# Patient Record
(demographics unavailable — no encounter records)

---

## 2017-10-31 NOTE — EMERGENCY ROOM VISIT NOTE
History


Report prepared by [place]:  Matheus Morin


Under the Supervision of:  Dr. Dick Muñoz M.D.


First contact with patient:  18:09


Chief Complaint:  CHEST PAIN


Stated Complaint:  PAIN IN CHEST,INABILITY TO EAT





History of Present Illness


The patient is a 32 year old male who presents to the Emergency Room with 

complaints of constant left sided chest pain beginning 10 weeks ago. The 

patient states that his pain is centralized in his lower left chest. He notes 

that his pain has prevented him from eating a lot for the past 10 weeks. He 

reports that the pain is not major, but states that the pain is enough to 

prevent him from eating. He notes that his pain is not mental but feels as 

though he has "poison in his blood stream." He reports that he has been able to 

drink fluids. He also complains of SOB, nausea, numbness in his left arm, and 

vision issues in his eyes. The patient states that he occasionally has a "light 

spot" right in the center of his eyes that blurs his vision. He notes chronic 

pain in his joints for the past few years that waxes and wanes in intensity. He 

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 

raise chickens and cows. He denies any rashes, cough, burning or bloody urine, 

congestion, diarrhea, and exposure to ticks.





   Source of History:  patient


   Onset:  10 weeks ago


   Position:  chest


   Timing:  constant


   Modifying Factors (Worsening):  eating


   Associated Symptoms:  + SOB, + nausea, + numbness (in his left arm), No cough

, No diarrhea, No urinary symptoms


Note:


he also complains of vision problems. he denies  any congestion and rashes





Review of Systems


See HPI for pertinent positives and negatives.  A total of ten systems were 

reviewed and were otherwise negative.





Past Medical & Surgical


Medical Problems:


(1) Chronic pain








Family History


No pertinent family history stated.





Social History


Smoking Status:  Never Smoker


Marital Status:  single


Housing Status:  lives with family


Occupation Status:  unemployed





Current/Historical Medications


Scheduled


Doxycycline Monohydrate (Monodox), 100 MG PO BID





Scheduled PRN


Ibuprofen Tab (Motrin), 800 MG PO Q8H PRN for Pain





Allergies


Coded Allergies:  


     No Known Allergies (Unverified , 10/31/17)





Physical Exam


Vital Signs











  Date Time  Temp Pulse Resp B/P (MAP) Pulse Ox O2 Delivery O2 Flow Rate FiO2


 


10/31/17 23:19  72 16 126/84 97   


 


10/31/17 22:31  80 20 127/83 97   


 


10/31/17 21:00  93 23 126/87 95 Room Air  


 


10/31/17 19:12  79      


 


10/31/17 18:55     100 Room Air  


 


10/31/17 18:47  84 18 128/82 97 Room Air  


 


10/31/17 18:05 36.7 118 16 144/91 99 Room Air  











Physical Exam


GENERAL: Awake, alert, in no acute distress, anxious appearing.


HENT: Normocephalic, atraumatic. Oropharynx unremarkable. Dry mucous membranes.


EYES: Normal conjunctiva. Sclera non-icteric.


NECK: Supple. No nuchal rigidity. FROM. No JVD.


RESPIRATORY: Clear to auscultation.


CARDIAC: Regular rate, normal rhythm. Extremities warm and well perfused. 

Pulses equal.


ABDOMEN: Soft, non-distended. No tenderness to palpation. No rebound or 

guarding. No masses.


RECTAL: Deferred.


MUSCULOSKELETAL: Chest examination reveals no tenderness. The back is 

symmetrical on inspection without obvious abnormality. There is no CVA 

tenderness to palpation. No joint edema. 


LOWER EXTREMITIES: Calves are equal size bilaterally and non-tender. No edema. 

No discoloration. 


NEURO: Normal sensorium. No sensory or motor deficits noted. 


SKIN: No rash or jaundice noted.





Medical Decision & Procedures


ER Provider


Diagnostic Interpretation:


Radiology results as stated below per my review and radiologist interpretation: 





CHEST ONE VIEW PORTABLE





FINDINGS: Lung volumes are normal. Lungs are clear. No pneumothorax or pleural


effusion is present. Cardiomediastinal silhouette is normal. Pulmonary


vascularity is normal. 





IMPRESSION:  No acute cardiopulmonary findings. 





Electronically signed by:  Yuri Richmond M.D.


10/31/2017 6:59 PM





HEAD WITHOUT CONTRAST (CT)





FINDINGS: 





No acute intracranial hemorrhage, midline shift, mass, large territorial


ischemia or abnormal extra-axial collection.  





The calvarium is intact.  The paranasal sinuses, mastoid air cells, and middle


ear cavities are clear. Focal soft tissue prominence of the right frontal scalp


is seen, 2.0 x 0.8 cm with areas of internal fatty attenuation suggesting scalp


lipoma.





IMPRESSION:  


1. No acute intracranial abnormality.


2. Focal soft tissue prominence of the right frontal scalp with fat attenuation


suggests scalp lipoma.





The above report was generated using voice recognition software. It may contain


grammatical, syntax or spelling errors.





Electronically signed by:  Tommy Frances M.D.


10/31/2017 7:36 PM





Laboratory Results


10/31/17 18:40








Red Blood Count 5.69, Mean Corpuscular Volume 84.7, Mean Corpuscular Hemoglobin 

28.8, Mean Corpuscular Hemoglobin Concent 34.0, Mean Platelet Volume 9.7, 

Neutrophils (%) (Auto) 63.9, Lymphocytes (%) (Auto) 25.7, Monocytes (%) (Auto) 

9.6, Eosinophils (%) (Auto) 0.4, Basophils (%) (Auto) 0.3, Neutrophils # (Auto) 

4.52, Lymphocytes # (Auto) 1.82, Monocytes # (Auto) 0.68, Eosinophils # (Auto) 

0.03, Basophils # (Auto) 0.02





10/31/17 18:40

















Test


  10/31/17


18:40 10/31/17


19:50


 


White Blood Count


  7.08 K/uL


(4.8-10.8) 


 


 


Red Blood Count


  5.69 M/uL


(4.7-6.1) 


 


 


Hemoglobin


  16.4 g/dL


(14.0-18.0) 


 


 


Hematocrit 48.2 % (42-52)  


 


Mean Corpuscular Volume


  84.7 fL


() 


 


 


Mean Corpuscular Hemoglobin


  28.8 pg


(25-34) 


 


 


Mean Corpuscular Hemoglobin


Concent 34.0 g/dl


(32-36) 


 


 


Platelet Count


  248 K/uL


(130-400) 


 


 


Mean Platelet Volume


  9.7 fL


(7.4-10.4) 


 


 


Neutrophils (%) (Auto) 63.9 %  


 


Lymphocytes (%) (Auto) 25.7 %  


 


Monocytes (%) (Auto) 9.6 %  


 


Eosinophils (%) (Auto) 0.4 %  


 


Basophils (%) (Auto) 0.3 %  


 


Neutrophils # (Auto)


  4.52 K/uL


(1.4-6.5) 


 


 


Lymphocytes # (Auto)


  1.82 K/uL


(1.2-3.4) 


 


 


Monocytes # (Auto)


  0.68 K/uL


(0.11-0.59) 


 


 


Eosinophils # (Auto)


  0.03 K/uL


(0-0.5) 


 


 


Basophils # (Auto)


  0.02 K/uL


(0-0.2) 


 


 


RDW Standard Deviation


  37.7 fL


(36.4-46.3) 


 


 


RDW Coefficient of Variation


  12.4 %


(11.5-14.5) 


 


 


Immature Granulocyte % (Auto) 0.1 %  


 


Immature Granulocyte # (Auto)


  0.01 K/uL


(0.00-0.02) 


 


 


Erythrocyte Sedimentation Rate 2 mm/hr (0-14)  


 


Anion Gap


  9.0 mmol/L


(3-11) 


 


 


Est Creatinine Clear Calc


Drug Dose 92.9 ml/min 


  


 


 


Estimated GFR (


American) 99.1 


  


 


 


Estimated GFR (Non-


American 85.5 


  


 


 


BUN/Creatinine Ratio 15.2 (10-20)  


 


Calcium Level


  9.8 mg/dl


(8.5-10.1) 


 


 


Total Bilirubin


  0.7 mg/dl


(0.2-1) 


 


 


Direct Bilirubin


  0.2 mg/dl


(0-0.2) 


 


 


Aspartate Amino Transf


(AST/SGOT) 14 U/L (15-37) 


  


 


 


Alanine Aminotransferase


(ALT/SGPT) 18 U/L (12-78) 


  


 


 


Alkaline Phosphatase


  40 U/L


() 


 


 


Troponin I


  < 0.015 ng/ml


(0-0.045) 


 


 


C-Reactive Protein


  < 0.29 mg/dl


(0-0.29) 


 


 


Total Protein


  7.7 gm/dl


(6.4-8.2) 


 


 


Albumin


  4.8 gm/dl


(3.4-5.0) 


 


 


Lipase


  115 U/L


() 


 


 


Thyroid Stimulating Hormone


(TSH) 1.760 uIu/ml


(0.300-4.500) 


 


 


Lyme Disease IgG Antibody POS (NEG)  


 


Urine Color  YELLOW 


 


Urine Appearance  CLEAR (CLEAR) 


 


Urine pH  6.0 (4.5-7.5) 


 


Urine Specific Gravity


  


  1.029


(1.000-1.030)


 


Urine Protein  NEG (NEG) 


 


Urine Glucose (UA)  NEG (NEG) 


 


Urine Ketones  4+ (NEG) 


 


Urine Occult Blood  NEG (NEG) 


 


Urine Nitrite  NEG (NEG) 


 


Urine Bilirubin  NEG (NEG) 


 


Urine Urobilinogen  NEG (NEG) 


 


Urine Leukocyte Esterase  NEG (NEG) 





Laboratory results reviewed by me





Medications Administered











 Medications


  (Trade)  Dose


 Ordered  Sig/Alexia


 Route  Start Time


 Stop Time Status Last Admin


Dose Admin


 


 Sodium Chloride  1,000 ml @ 


 999 mls/hr  Q1H1M STAT


 IV  10/31/17 18:14


 10/31/17 19:14 DC 10/31/17 18:43


999 MLS/HR


 


 Famotidine


  (Pepcid 20mg Iv


 Push)  20 mg  NOW  STAT


 IV  10/31/17 18:14


 10/31/17 18:25 DC 10/31/17 19:40


20 MG


 


 Ondansetron HCl


  (Zofran Inj)  4 mg  NOW  STAT


 IV  10/31/17 18:14


 10/31/17 18:25 DC 10/31/17 18:43


4 MG


 


 Lidocaine HCl


  (Viscous


 Lidocaine 2% Soln)  20 ml  STK-MED ONCE


 .ROUTE  10/31/17 18:41


 10/31/17 18:42 DC 10/31/17 18:44


20 ML


 


 Al Hydroxide/Mg


 Hydroxide


  (Maalox Susp)  30 ml  STK-MED ONCE


 .ROUTE  10/31/17 18:41


 10/31/17 18:42 DC 10/31/17 18:44


30 ML


 


 Ketorolac


 Tromethamine


  (Toradol Inj)  30 mg  NOW  STAT


 IV  10/31/17 20:40


 10/31/17 20:42 DC 10/31/17 20:52


30 MG


 


 Ceftriaxone


 Sodium 2000 mg/


 Dextrose  70 ml @ 


 100 mls/hr  ONE  STAT


 IV  10/31/17 20:40


 10/31/17 21:21 DC 10/31/17 20:51


100 MLS/HR











ECG


Indication:  chest pain


Rate (beats per minute):  87


Rhythm:  normal sinus


Findings:  no acute ischemic change, other (normal axis)





ED Course


1813: The patient was evaluated in room C6. A complete history and physical 

exam was performed.





1814: GI Cocktail 24ml PO, Zofran Inj 4mg IV, Famotidine 20mg IV





1849: I did a bedside echo and gallbladder US on the patient. The echo showed 

no pericardial effusion and normal LV and RV size effusion. The gallbladder US 

showed no gallstones or pericholecystic fluid.





2030: I reevaluated and updated the patient. He states that the body pain that 

he has been experiencing for the past few years has mostly been migrating joint 

pain. He notes that his chest pain is new. 





2040: Ceftriaxone Sodium 2000mg/Dextrose 70 ml @ 100mls/hr IV, Toradol Inj 30mg 

IV





2211: I reevaluated the patient. Discussed results and discharge instructions: 

He verbalized understanding and agreement. The patient is ready for discharge.





Medical Decision


I reviewed the patient's past medical history, medications, and the nursing 

notes as described above.





Differential diagnoses include: gastritis, reflux, pneumonia, bronchitis, 

pneumonitis, pericarditis, lyme, dehydration, electrolyte abnormality, 

depression, and anxiety.





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 

past medical history of an AVM status post repair remotely who presents 

emergency Department with vague complaints of epigastric and chest pain that 

his been constant with associated anorexia and weight loss for the past 2 

months with associated intermittent nausea, shortness of breath, tingling per 

history of present illness.  Of note the patient reports that he is a long-

standing history for years of chronic pain everywhere and is somewhat vague 

about the last time he did not have any pain but thinks a few months ago hes 

had a couple days here and there.  Despite this hes never seen a doctor for 

his symptoms and comes to emergency department today because he feels he was 

worse and thought it was about time he was evaluated.  Arrival the patient is 

anxious appearing but otherwise in no acute distress, afebrile with stable 

vital signs.  Lungs clear to auscultation bilaterally. 


EKG unremarkable with normal intervals and no NH depression or JODI. Negative 

Spodick's sign. Bedside echo with no pericardial effusion, grossly normal LV 

and RV size and function. Bedside RUQ US negative for gallstone and negative 

for pericholecystic fluid.


WBC, trop, ESR, CRP wnl. Thus myocarditis and pericarditis unlikely. However, 

Lyme screen positive and given patient's constant sx of chest burning will need 

cards f/u for formal echo. Cardiac CRP sent and pending. UA with ketones c/w 

patient clinically dry appearance. Labs otherwise unremarkable. CXR negative. 


Will treat for Lyme with 21 day course. Given initial IV dose of CTX in ED. CM 

assisting to arrange follow up given patient does not have insurance and does 

not have a pcp. However patient preferring to arrange f/u on his own. Parents 

at bedside update and are aware. Findings and plan/importance for follow-up 

reviewed with patient. Patient agreeable and d/c'd per discharge instructions.





[place] Drug Monitoring Program


Search Results:  patient reviewed within database





Blood Pressure Screening


Patient's blood pressure:  Elevated blood pressure


Blood pressure disposition:  Elevated BP felt to be situational





Impression





 Primary Impression:  


 Substernal precordial chest pain


 Additional Impression:  


 Lyme disease, unspecified





Scribe Attestation


The scribe's documentation has been prepared under my direction and personally 

reviewed by me in its entirety. I confirm that the note above accurately 

reflects all work, treatment, procedures, and medical decision making performed 

by me.





Departure Information


Dispostion


Home / Self-Care





Prescriptions





Ibuprofen Tab (MOTRIN) 800 Mg Tab


800 MG PO Q8H Y for Pain for 14 Days, #42 TAB


   Prov: Dick Muñoz M.D.         10/31/17 


Doxycycline Monohydrate (Monodox) 100 Mg Cap


100 MG PO BID for 21 Days, #42 CAP


   Prov: Dick Muñoz M.D.         10/31/17





Forms


Call Back Authorization, HOME CARE DOCUMENTATION FORM,                         

                                       IMPORTANT VISIT INFORMATION





Patient Instructions


Chest Pain - [facility], ED Chest Pain Atypical Unkn Cause, [facility]





Additional Instructions





Please follow up with and establish a primary care physician and with 

cardiology within the next week for re-evaluation.


This is very important that you call to make these appointments as you did not 

want our  to assist with this.





The cause of your symptoms is not clear at this time may be due to Lyme disease.


Otherwise, your exam, EKG, chest xray, CT scan of your head, and lab results 

did not show signs of an emergent condition at this time.





Doxycycline, antibiotic, as directed for 21 days.


Ibuprofen for pain as needed as directed.


Drink plenty of fluids to ensure hydration.





Return to the emergency department for worsening symptoms as described in the 

accompanying instructions.





Problem Qualifiers

## 2017-10-31 NOTE — DIAGNOSTIC IMAGING REPORT
CHEST ONE VIEW PORTABLE



CLINICAL HISTORY: Chest pain.    



COMPARISON STUDY:  No previous studies for comparison.



FINDINGS: Lung volumes are normal. Lungs are clear. No pneumothorax or pleural

effusion is present. Cardiomediastinal silhouette is normal. Pulmonary

vascularity is normal. 



IMPRESSION:  No acute cardiopulmonary findings. 









Electronically signed by:  Yuri Richmond M.D.

10/31/2017 6:59 PM



Dictated Date/Time:  10/31/2017 6:58 PM

## 2017-10-31 NOTE — DIAGNOSTIC IMAGING REPORT
HEAD WITHOUT CONTRAST (CT)



CLINICAL HISTORY: 32 years-old Male with blurred vision, prior AVM resection. 

Acute blurred vision. History of prior AVM resection.  



TECHNIQUE: Multiple axial CT images of the head were obtained without contrast. 

A dose lowering technique was utilized adhering to the principles of ALARA.



CT DOSE:  537.48 mGy.cm



COMPARISON: None.



FINDINGS: 



No acute intracranial hemorrhage, midline shift, mass, large territorial

ischemia or abnormal extra-axial collection.  



The calvarium is intact.  The paranasal sinuses, mastoid air cells, and middle

ear cavities are clear. Focal soft tissue prominence of the right frontal scalp

is seen, 2.0 x 0.8 cm with areas of internal fatty attenuation suggesting scalp

lipoma.



IMPRESSION:  

1. No acute intracranial abnormality.

2. Focal soft tissue prominence of the right frontal scalp with fat attenuation

suggests scalp lipoma.







The above report was generated using voice recognition software. It may contain

grammatical, syntax or spelling errors.







Electronically signed by:  Tommy Frances M.D.

10/31/2017 7:36 PM



Dictated Date/Time:  10/31/2017 7:32 PM
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
## 2017-11-06 NOTE — EMERGENCY ROOM VISIT NOTE
History


Report prepared by Isreal:  Janine Reyes


Under the Supervision of:  Dr. Jean-Pierre Bajwa M.D.


First contact with patient:  11:01


Chief Complaint:  MENTAL HEALTH EVALUATION


Stated Complaint:  SUICIDAL, HEART





History of Present Illness


The patient is a 32 year old male who presents to the Emergency Room with 

complaints of intermittent anxiety that began several weeks ago.  The patient 

states that 10 weeks ago he had a trigger and notes increased stress.  He 

states that over the past several weeks he states that things have become 

overwhelming for him.  The patient states that he feels defeated and has lost 

the will to live.  He states that he has been taking all his medications as 

prescribed.  The patient describes four different episodes of anxiety going 

back several weeks.  He reports that with each episode he notes a heavy stress 

and strain on his heart.  The patient states that the first one lasted five 

minutes and notes that he was breathing heavily.  He states that he did not see 

anyone for the symptoms.  The patient states that he additionally noticed left 

arm numbness at that time.  He states that his second episode was on Friday 

noting that he was outside with his dogs.  The patient states that this episode 

lasted 5-10 minutes.  He states that he also had an episodes that occurred on 

Saturday and Sunday and notes that each of these episodes were longer.  The 

patient states that he feels exhausted after each episode.  He states that he 

does not feel safe going home, noting that he would hang himself, noting that 

he has no access to firearms.  The patient denies any diagnosis of anxiety or 

depression.  He states that he does feel secure around people.  





Per records, the patient was here on October 31st for chest pain and had a 

negative work up except for possible lyme disease.  He was also here on 

November 2nd for shortness of breath with a negative work up.  He is on 

Doxycycline for the possibility of Lyme Disease--the confirmative testing is 

still pending.





   Source of History:  patient


   Onset:  several weeks ago


   Position:  other (global)


   Quality:  other (anxiety)


   Timing:  other (persistent)


Note:


Associated Symptoms: suicidal with plan to hang himself, increased stress, 

chest heaviness





Review of Systems


See HPI for pertinent positives & negatives. A total of 10 systems reviewed and 

were otherwise negative.





Past Medical & Surgical


Medical Problems:


(1) Anxiety


(2) Chronic pain


(3) Lyme disease


(4) Suicidal ideation








Family History





Patient reports no known family medical history.





Social History


Smoking Status:  Never Smoker


Drug Use:  none


Marital Status:  single


Housing Status:  lives with family


Occupation Status:  unemployed





Current/Historical Medications


Scheduled


Doxycycline Monohydrate (Monodox), 100 MG PO BID





Scheduled PRN


Ibuprofen Tab (Motrin), 800 MG PO Q8H PRN for Pain


Pantoprazole (Protonix), 20 MG PO DAILY PRN for ACID REFLUX


Ranitidine Hcl (Zantac), 150 MG PO BID PRN for ACID REFLUX





Allergies


Coded Allergies:  


     No Known Allergies (Unverified , 11/6/17)





Physical Exam


Vital Signs











  Date Time  Temp Pulse Resp B/P (MAP) Pulse Ox O2 Delivery O2 Flow Rate FiO2


 


11/6/17 13:25  90 18 126/86 99 Room Air  


 


11/6/17 10:41 37.0 109 18 145/114 100 Room Air  











Physical Exam


GENERAL: Patient is in no acute distress.


HEENT: No acute trauma, normocephalic atraumatic, mucous membranes moist, no 

nasal congestion, no scleral icterus.


NECK: No stridor, no adenopathy, no meningismus, trachea is midline.


LUNGS: Clear to auscultation bilaterally, no wheeze, no rhonchi, breath sounds 

equal.


HEART: Without murmurs gallops or rubs, regular rate and rhythm.


ABDOMEN: Soft, nontender, bowel sounds positive, no hernias, no peritonitis.


EXTREMITIES: No cyanosis or edema, full range of motion of all the joints 

without pain or difficulty, no signs for acute trauma.


NEUROLOGIC: Oriented x 3, no acute motor or sensory deficits, no focal weakness.


SKIN: No rash, no jaundice, no diaphoresis.


PSYCH: Anxious, admits to suicidal ideation with a plan to hang himself.





Medical Decision & Procedures


Laboratory Results


11/6/17 11:24








11/6/17 11:24

















Test


  11/6/17


11:00 11/6/17


11:24


 


Urine Color YELLOW  


 


Urine Appearance CLOUDY (CLEAR)  


 


Urine pH


  >= 9.0


(4.5-7.5) 


 


 


Urine Specific Gravity


  1.027


(1.000-1.030) 


 


 


Urine Protein NEG (NEG)  


 


Urine Glucose (UA) NEG (NEG)  


 


Urine Ketones 1+ (NEG)  


 


Urine Occult Blood NEG (NEG)  


 


Urine Nitrite NEG (NEG)  


 


Urine Bilirubin NEG (NEG)  


 


Urine Urobilinogen NEG (NEG)  


 


Urine Leukocyte Esterase TRACE (NEG)  


 


Urine WBC (Auto) 1-5 /hpf (0-5)  


 


Urine RBC (Auto) 0-4 /hpf (0-4)  


 


Urine Hyaline Casts (Auto) 0 /lpf (0-5)  


 


Urine Epithelial Cells (Auto) 0-5 /lpf (0-5)  


 


Urine Bacteria (Auto) NEG (NEG)  


 


Urine Opiates Screen NEG (NEG)  


 


Urine Methadone, Qualitative NEG (NEG)  


 


Urine Barbiturates NEG (NEG)  


 


Urine Phencyclidine (PCP)


Level NEG (NEG) 


  


 


 


Ur


Amphetamine/Methamphetamine NEG (NEG) 


  


 


 


MDMA (Ecstasy) Screen NEG (NEG)  


 


Urine Benzodiazepines Screen NEG (NEG)  


 


Urine Cocaine Metabolite NEG (NEG)  


 


Urine Marijuana (THC) NEG (NEG)  


 


Red Blood Count


  


  5.60 M/uL


(4.7-6.1)


 


Mean Corpuscular Volume


  


  85.5 fL


()


 


Mean Corpuscular Hemoglobin


  


  29.8 pg


(25-34)


 


Mean Corpuscular Hemoglobin


Concent 


  34.9 g/dl


(32-36)


 


RDW Standard Deviation


  


  39.0 fL


(36.4-46.3)


 


RDW Coefficient of Variation


  


  12.4 %


(11.5-14.5)


 


Mean Platelet Volume


  


  10.2 fL


(7.4-10.4)


 


Anion Gap


  


  6.0 mmol/L


(3-11)


 


Est Creatinine Clear Calc


Drug Dose 


  93.8 ml/min 


 


 


Estimated GFR (


American) 


  101.3 


 


 


Estimated GFR (Non-


American 


  87.4 


 


 


BUN/Creatinine Ratio  14.6 (10-20) 


 


Calcium Level


  


  9.6 mg/dl


(8.5-10.1)


 


Total Bilirubin


  


  0.6 mg/dl


(0.2-1)


 


Aspartate Amino Transf


(AST/SGOT) 


  9 U/L (15-37) 


 


 


Alanine Aminotransferase


(ALT/SGPT) 


  17 U/L (12-78) 


 


 


Alkaline Phosphatase


  


  45 U/L


()


 


Total Protein


  


  7.8 gm/dl


(6.4-8.2)


 


Albumin


  


  4.7 gm/dl


(3.4-5.0)


 


Globulin


  


  3.1 gm/dl


(2.5-4.0)


 


Albumin/Globulin Ratio  1.5 (0.9-2) 


 


Thyroid Stimulating Hormone


(TSH) 


  0.727 uIu/ml


(0.300-4.500)


 


Salicylates Level


  


  < 1.7 mg/dl


(2.8-20)


 


Acetaminophen Level


  


  < 2 ug/ml


(10-30)


 


Ethyl Alcohol mg/dL


  


  < 3.0 mg/dl


(0-3)








Laboratory results reviewed by me.





ED Course


1103: The patient was evaluated in room A6. A complete history and physical 

exam was performed.





1229: The patient is medically clear for further psychiatric evaluation.





1340: The patient has been accepted to [facility] for further mental health 

evaluation and treatment.





Medical Decision


The patient is a 32 year old male who presents to the ED with complaints of 

anxiety.  Differential diagnoses considered include Suicidal ideation, thyroid 

disorder, drug and alcohol abuse, psychosis, anxiety, electrolyte imbalance.





There is no leukocytosis or concerning anemia.  No significant electrolyte 

abnormality, kidney failure or hepatitis.  The patient appears to be in a 

euthyroid state.  Urine tox is negative.  Urinalysis does not show infection.  

Alcohol, aspirin and Tylenol levels are essentially undetectable.





The patient resents with what sounds like anxiety and panic.  He has worsened 

over the last few days and is now suicidal.  He has a plan to hang himself.





The patient is voluntary, I do think he is medically clear for a psychiatric 

evaluation. 





The patient was seen by the psychiatry team, he is being hospitalized on the 3 

S. psychiatric floor.  He has been cooperative during his ER stay.





Medication Reconcilliation


Current Medication List:  was personally reviewed by me





Impression





 Primary Impression:  


 Suicidal ideation


 Additional Impression:  


 Anxiety





Scribe Attestation


The scribe's documentation has been prepared under my direction and personally 

reviewed by me in its entirety. I confirm that the note above accurately 

reflects all work, treatment, procedures, and medical decision making performed 

by me.





Departure Information


Dispostion


Mental Health Acute Care





Referrals


No Doctor, Assigned (PCP)





Problem Qualifiers

## 2017-11-07 NOTE — PHARMACY PROGRESS NOTE
ED Pharmacist Culture FollowUp


Date of Service:


Nov 7, 2017.





Patient was sent home with a prescription for doxycycline, which should cover 

for the positive lymes result. Of note, the patient is now inpatient and 

continues on doxy course as per MD note. Charge nurse also called and informed 

nurse of the result.

## 2017-11-07 NOTE — PSYCHIATRIC PROGRESS NOTES
Progress Note


Date of Service


Nov 7, 2017.





Interval History


Cain Zhou is a 32-year-old male who currently lives in [place], has no 

psychiatric history, and presented to the emergency room requesting psychiatric 

admission for stress, anxiety, and suicidal thoughts.  He was admitted on a 201 

voluntary commitment.





Chief Complaint


"Can we talk somewhere open, get some fresh air?"





Subjective


Patient was seen & assessed interval progress reviewed with Nursing. Staff 

report he has been very anxious, frequently requesting to talk to staff, and 

demonstrating poor insight into his illness and symptoms.  He told staff that 

he didn't feel comfortable being alone with a female who was not his wife, and 

wanted doors to be kept open when he was meeting with staff.  When staff 

pointed out the concern for privacy, he said that wasn't a concern as he didn't 

have any mental health problems.  Last evening he had an episode of heavy 

breathing, tingling of his hands and feet, and wanted to go to the emergency 

room.  His vital signs were normal, and he was educated on breathing techniques 

for managing anxiety.  He said he thought the symptoms were related to his Lyme 

disease.  He requested to go outside to get fresh air, and refused dinner.  He 

received multiple doses of hydroxyzine for anxiety and sleep, and did not feel 

it was helpful.  He told staff he did not think we would be able to help him 

with his anxiety.  Staff report that he slept 8 hours, but he states he did not 

sleep.





On my assessment this morning, the patient is extremely anxious, stating that 

he wants to be seen in an open area as he needs fresh air, and was agreeable to 

using the interview office with the door propped open.  He stated he did not 

care if others could hear our conversation and was not concerned about 

confidentiality.  He says he slept very poorly, feels very anxious, and wants 

to be given a medication to "knock me out for a few hours."  He again goes into 

a long explanation of his symptoms over time. He says he is "a sentimental and 

emotional person," frequently feels overwhelmed, and then says that he is 

"stable," and does not believe he has a mental illness.  He says he asked staff 

to have his parents come in this morning to visit with him as he felt so 

overwhelmed and wanted something familiar. He had to be redirected frequently 

throughout the course of the interview, she frequently answers with unrelated 

information. He says he is "overwhelmingly focused on what I'm feeling," and 

continues to have somatic symptoms of anxiety, with racing heart, tingling, 

"feel like my heart is going to explode." He says he was upset that staff would 

not take him to the ER when he had a panic attack last night, as "I felt like I 

was going to die."  We again reviewed the symptoms of panic, and things that he 

can do to try to calm himself when it happens.  He denies intent to harm 

himself here, but still feels he might die from his panic attacks, and that 

suicide is an option if he doesn't feel better. He got hydroxyzine last night 

and says it "did nothing for me, the pills had a negative effect, messed with 

my bladder, I have the same thing with certain foods, they just affect my 

bladder negatively." He struggles to explain this more clearly, other than 

saying that he feels like he has to urinate.  He denies other symptoms of UTI.  

Attempted to discuss SSRIs again as a treatment for panic, as well as therapy, 

behavioral techniques of managing anxiety, and he was poorly able to engage.





Sleep Information


Total Hours of Sleep:  8.00





Meal Information


Percent of Dinner Consumed:  0





Mental Status Exam


During interview pt is:  alert and oriented, cooperative (but very anxious, 

often answers Miriam or with unrelated information, a limited historian due to 

anxiety)


Appearance:  appropriately dressed (paper scrubs), appropriately groomed, other 

(large bump and scar on his right forehead)


Eye contact is:  fair


Motor behavior is:  steady gait & station, psychomotor agitation (fidgeting)


Speech:  is pressured, other (hyperverbal, rapid speech)


Affect:  irritable, anxious, constricted


Mood is:  anxious


Thought process:  circumstantial


Thought content:  preoccupation (with his somatic symptoms), cognitive 

distortions


Suicidal thought are:  present, Plan: denied


Homicidal thoughts are:  denied


Hallucinations:  denies auditory, denies visual


Cognition:  memory grossly intact, attention grossly intact, language grossly 

intact


Intelligence estimated to be:  consistent with level of education


Insight:  impaired


Judgement:  impaired





Impression


32-year-old single white male with no psychiatric or significant medical 

history other than a head injury 13 years ago, who lives with his parents on 

their farm in [place], and presented to the emergency room 3 times in the past 

week for chest pain, shortness of breath, and panic attacks.  He's had an 

extensive medical workup which was negative, other than a preliminary positive 

Lyme titer for which he was started on doxycycline.  He is resistant to the 

idea that his symptoms may be caused by a mental illness, but meets criteria 

for a diagnosis of panic disorder, as he reports 4 panic attacks in the past 

several weeks, and worsening anxiety over the past 10 weeks since he was let go 

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  He alludes 

to some allegations that he was unstable, but cannot or will not give more 

detailed information.  He later disclosed to staff that he was actually 

criminally charged with harassment and has a hearing coming up next month.  It'

ll be important to get collateral information from his parents whom he lives 

with as he was somewhat reluctant to answer questions, and is invested in not 

being diagnosed with mental illness.





Plan





(1) Anxiety


Most likely diagnosis is panic disorder, although at this point cannot rule out 

anxiety due to general medical condition (Lyme disease). 


Also rule out major depressive disorder.


Get collateral information from family (regarding his adult life, previous 

level of functioning prior to the past 10 weeks, they're understanding of why 

he has not been able to work for the past 6 years, if there was any worsening 

of symptoms after his head injury 13 years ago, and clarification of recent 

stressors, specifically why he was asked to stop volunteering).  Family meeting 

with parents.  They will need to be involved in discharge safety planning as 

well.


We briefly discussed medication options for panic, specifically an SSRI 

antidepressant.  The patient would like to try nonpharmacologic methods of 

handling his anxiety first.  Refer for outpatient therapy and psychiatric follow

-up.


Hydroxyzine as needed for sleep and anxiety.


Encourage participation in unit groups and programming.  Work on healthy coping 

skills.





11/7


-Patient continues to report high anxiety and poor sleep. He continues to 

refuse a trial of an SSRI, although he has been educated repeatedly about the 

risks, benefits and side effects. Provided him with an Up To Date patient 

handout about SSRIs and encouraged him to continue to educate himself and ask 

questions. If he agrees, will start escitalopram 10mg.


-Reviewed behavioral techniques he can utilize to manage anxiety. Avoiding 

benzos if possible, as patient states he does not want medication that will be 

sedating, but he has worsened throughout the day today and has approached staff 

multiple times asking for something prn for anxiety, doesn't feel hydroxyzine 

worked, so will try lorazepam 0.5mg q 6 hrs prn. 


-Patient is requesting a different medication for sleep.  We reviewed the risks

, benefits, and side effects of trazodone, including priapism, and he agreed to 

a trial.  I will order 50 mg daily at bedtime tonight, which can be repeated 

once if needed.





(2) Suicidal ideation


Every 15 minute checks for safety.


Work on healthy coping skills and discharge safety plan.





(3) Lyme disease


Continue home dose of doxycycline, and follow-up with outpatient provider as 

directed by emergency room staff.  He does not have a PCP, and states his 

parents were working on getting him medical assistance.








Discharge / Aftercare Planning


Therapist:  


   Name:  None


:  


   Name:  None





Visit Code


E&M Code:  92696





Inventory Assets


Strengths:


Has housing, supportive family





Risk Factors Assessment


Male:  Yes


:  Yes


/single/:  Yes


Higher / Fall in social status:  Yes


Health problems:  Yes


Mental Health Diagnoses:  Yes


Substance use disorders:  No


Previous attempt:  No


Previous psychiatric stay:  No


Hopelessness:  Yes


Smoker:  No





Protective Factors Assessment


Yazdanism beliefs:  No


:  No


Responsible for young children:  No


Employed:  No


Stable relationships:  No


Supportive family:  Yes


Good rapport with provider:  No





Data


Vital Signs Last 24 Hrs:











  Date Time  Temp Pulse Resp B/P (MAP) Pulse Ox O2 Delivery O2 Flow Rate FiO2


 


11/7/17 06:48 36.5 96 18 136/90    





  108  127/87    


 


11/6/17 17:30  96 20 138/87    


 


11/6/17 15:52 37.0 88 18 122/90    


 


11/6/17 14:15  99 18 118/94 97   


 


11/6/17 13:25  90 18 126/86 99 Room Air  


 


11/6/17 10:41 37.0 109 18 145/114 100 Room Air  








Meds Administered Last 24 Hrs:





Meds Administered (Past 24Hrs)








 Medications


  (Trade)  Dose


 Ordered  Sig/Alexia


 Route  Start Time


 Stop Time Status Last Admin


Dose Admin


 


 Hydroxyzine HCl


  (Vistaril Tab)  50 mg  HSZ  PRN


 PO  11/6/17 13:30


 12/6/17 13:29  11/6/17 20:35


50 MG


 


 Hydroxyzine HCl


  (Vistaril Tab)  25 mg  Q4H  PRN


 PO  11/6/17 13:30


 12/6/17 13:29  11/7/17 06:22


25 MG


 


 Doxycycline


 Hyclate


  (Vibramycin Cap)  100 mg  BID


 PO  11/6/17 21:00


 11/21/17 20:59  11/6/17 20:31


100 MG








Lab Results Last 24 Hrs:





Last 24 Hours








Test


  11/6/17


11:00 11/6/17


11:24


 


Urine Color YELLOW  


 


Urine Appearance CLOUDY  


 


Urine pH >= 9.0  


 


Urine Specific Gravity 1.027  


 


Urine Protein NEG  


 


Urine Glucose (UA) NEG  


 


Urine Ketones 1+  


 


Urine Occult Blood NEG  


 


Urine Nitrite NEG  


 


Urine Bilirubin NEG  


 


Urine Urobilinogen NEG  


 


Urine Leukocyte Esterase TRACE  


 


Urine WBC (Auto) 1-5 /hpf  


 


Urine RBC (Auto) 0-4 /hpf  


 


Urine Hyaline Casts (Auto) 0 /lpf  


 


Urine Epithelial Cells (Auto) 0-5 /lpf  


 


Urine Bacteria (Auto) NEG  


 


Urine Opiates Screen NEG  


 


Urine Methadone, Qualitative NEG  


 


Urine Barbiturates NEG  


 


Urine Phencyclidine (PCP)


Level NEG 


  


 


 


Ur


Amphetamine/Methamphetamine NEG 


  


 


 


MDMA (Ecstasy) Screen NEG  


 


Urine Benzodiazepines Screen NEG  


 


Urine Cocaine Metabolite NEG  


 


Urine Marijuana (THC) NEG  


 


White Blood Count  5.48 K/uL 


 


Red Blood Count  5.60 M/uL 


 


Hemoglobin  16.7 g/dL 


 


Hematocrit  47.9 % 


 


Mean Corpuscular Volume  85.5 fL 


 


Mean Corpuscular Hemoglobin  29.8 pg 


 


Mean Corpuscular Hemoglobin


Concent 


  34.9 g/dl 


 


 


RDW Standard Deviation  39.0 fL 


 


RDW Coefficient of Variation  12.4 % 


 


Platelet Count  259 K/uL 


 


Mean Platelet Volume  10.2 fL 


 


Sodium Level  140 mmol/L 


 


Potassium Level  4.0 mmol/L 


 


Chloride Level  107 mmol/L 


 


Carbon Dioxide Level  27 mmol/L 


 


Anion Gap  6.0 mmol/L 


 


Blood Urea Nitrogen  16 mg/dl 


 


Creatinine  1.11 mg/dl 


 


Est Creatinine Clear Calc


Drug Dose 


  93.8 ml/min 


 


 


Estimated GFR (


American) 


  101.3 


 


 


Estimated GFR (Non-


American 


  87.4 


 


 


BUN/Creatinine Ratio  14.6 


 


Random Glucose  157 mg/dl 


 


Calcium Level  9.6 mg/dl 


 


Total Bilirubin  0.6 mg/dl 


 


Aspartate Amino Transf


(AST/SGOT) 


  9 U/L 


 


 


Alanine Aminotransferase


(ALT/SGPT) 


  17 U/L 


 


 


Alkaline Phosphatase  45 U/L 


 


Total Protein  7.8 gm/dl 


 


Albumin  4.7 gm/dl 


 


Globulin  3.1 gm/dl 


 


Albumin/Globulin Ratio  1.5 


 


Thyroid Stimulating Hormone


(TSH) 


  0.727 uIu/ml 


 


 


Salicylates Level  < 1.7 mg/dl 


 


Acetaminophen Level  < 2 ug/ml 


 


Ethyl Alcohol mg/dL  < 3.0 mg/dl
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
## 2017-11-09 NOTE — PSYCHIATRIC PROGRESS NOTES
Progress Note


Date of Service


Nov 9, 2017.





Interval History


Cain Zhou is a 32-year-old male who currently lives in [place], has no 

psychiatric history, and presented to the emergency room requesting psychiatric 

admission for stress, anxiety, and suicidal thoughts.  He was admitted on a 201 

voluntary commitment.





Chief Complaint


"I think that all of my feelings about being sick for the past six years is 

coming out".





Subjective


Patient was seen & assessed interval progress reviewed with Treatment Team.  I 

met with the patient individually in order to assess his current mental status, 

review his treatment plan, make any necessary adjustments in his treatment 

regimen, and address any questions and concerns that may arise.  Yesterday, 

with the patient's permission, I spoke with the patient's father regarding his 

Lyme disease test results, and the recommended treatment plan.  The patient's 

father is clearly anxious, and an underlying concern seems to be the fact that 

the patient's psychiatric illness occurs within the context of an older brother 

who has been diagnosed with schizophrenia.  The patient, himself, also 

acknowledges that he has a similar concern, although he is aware that he does 

not have any first rank symptoms of schizophrenia.  Today, the patient is able 

to more fully described his feelings of depression and sadness.  Previously, he 

had been reluctant to suggest that there is anything wrong with him other than 

"physical problems."  Today, he tells me that he is coming to realize how 

depressed he has been, and he notes that his depressed mood certainly occurs 

within the context of multiple physical problems, but is definitely a condition 

that requires treatment.  Although he says that he is not feeling much different

, he continues to note that lorazepam helps with his severe anxiety and 

although not completely.  He agreed to take sertraline 25 mg this morning and 

has not had any side effects which he is aware so far.  We discussed his Lyme 

disease results, and the fact that we are planning to request a infectious 

disease consultation given the fact that the patient's depression occurs within 

the context of what sounds like a six-year history of Lyme disease.  Perhaps as 

a function of his anxiety, the patient repeatedly requests detailed information 

concerning exactly what is likely to happen during an infectious disease 

consultation.  He, for example, wants to know how long it will take, the extent 

of the physical examination that will be required, the exact location of the 

consultation, etc.  He explains this by saying, "I parents will want to know 

the details, and so I would like to be able to tell them so they won't be 

nervous."  The patient continues to say that unless his mood and physical 

condition improved, he will continue to see suicide as a reasonable option.  He 

contracts for safety in the hospital, but tells me that he does not wish to 

leave the hospital and tell he feels better and his suicidal thoughts have 

resolved.  The patient reports that he has been sleeping well and response to 

trazodone 50 mg at bedtime, and he is not experiencing excess sedation in the 

morning.  He is, however, feeling particularly sad in the mornings, notes that 

the feelings of sadness don't go away, but may improve over time during the 

day.  I explained that this is a common pattern in depression.  The patient 

also notes that his appetite has improved somewhat, and he has been better able 

to eat, although he continues to experience some degree of epigastric "pressure

" ("it's not really a pain, just a pressure that keeps me from eating," the 

patient states).





Review of Systems


Constitutional:  No fever, No chills, No sweats, No weight loss, No weakness, 

No fatigue, No problem reported


ENT:  No hearing loss, No unusual epistaxis, No nasal symptoms, No sore throat, 

No tinnitus, No dental problems, No trouble swallowing, No problem reported


Respiratory:  No cough, No sputum, No wheezing, No shortness of breath, No 

dyspnea on exertion, No dyspnea at rest, No hemoptysis, No problem reported


Cardiovascular:  No chest pain, No orthopnea, No PND, No edema, No claudication

, No palpitations, No problem reported


Abdomen:  + problem reported (The patient continues to experience a sensation 

of "pressure" in his epigastric region, and he notes that this pressure 

sometimes makes it hard for him to eat.  However, he does not have difficulty 

swallowing, does not regurgitate his food, and says that he does not experience 

Arterburn," or other pain related to this general pressure which is very 

vaguely described.)


Musculoskeletal:  + joint pain, + muscle pain


Neurologic:  No memory loss, No paralysis, No weakness, No numbness/tingling, 

No vertigo, No balance problems, No problem reported


Psychiatric:  + depression symptoms, + anxiety


Integumentary:  No rash, No itch, No new/changing skin lesions, No color change

, No bleeding, No problem reported





Sleep Information


Total Hours of Sleep:  7.00





Meal Information


Percent of Breakfast Consumed:  100


Percent of Lunch Consumed:  100


Percent of Dinner Consumed:  50





Mental Status Exam


During interview pt is:  alert and oriented, cooperative


Appearance:  appropriately dressed, appropriately groomed, other (large bump 

and scar on his right forehead)


Eye contact is:  good


Motor behavior is:  steady gait & station, psychomotor agitation (fidgeting)


Speech:  other (The patient's voice is delivered at a soft volume.)


Affect:  depressed (although today he jokes about, and smiles appropriately on 

2 occasions.), anxious


Mood is:  depressed, anxious (although he reports that his anxiety has 

diminished significantly since beginning Ativan)


Thought process:  circumstantial


Thought content:  preoccupation (with his somatic symptoms)


Suicidal thought are:  present (the patient has what might be referred to as 

future conditional suicidal thoughts.  He says that he might consider suicide 

in the future if his physical symptoms and related limitations don't improve.  

He denies current suicidal thoughts.), Plan: denied


Homicidal thoughts are:  denied


Hallucinations:  denies auditory, denies visual


Cognition:  memory grossly intact, attention grossly intact, language grossly 

intact


Intelligence estimated to be:  consistent with level of education, above average


Insight:  fair


Judgement:  fair





Impression


This 32-year-old patient has been recently diagnosed with Lyme disease, and his 

history suggests that the symptoms of Lyme disease of been present for at least 

the past 6 years.  Within this context, and in consideration of the fact that 

his multiple physical complaints have substantially interfered with his ability 

to function in a number spheres, he is also quite depressed and anxious.  It is 

not clear to what degree his depression and anxiety may be a function of Lyme 

disease, but those illnesses may be unrelated, or they may be a function of his 

response to his present circumstances.  In any event, the patient is now 

willing to accept that he will require treatment for both depression and 

anxiety.  He tolerated the first dose of sertraline today at 25 mg without 

noted side effects, and the plan today will be to increase his dose of 

sertraline to 50 mg.  He also says that he has gotten "a lot of relief" from 

his anxiety symptoms with the use of lorazepam 0.5 mg every 6 hours when 

necessary anxiety, although he clearly remains quite anxious and fretful.  A, 

we will increase his dose of lorazepam to 1 mg by mouth 3 times a day as a 

standing dose order.  The patient is aware of the habituation potential, and 

understands that the goal will be to use lorazepam temporarily until he is 

adequately covered with an SSRI.  The patient remains suicidal, but is able to 

contract for safety in the hospital.  We planned ask for an infectious disease 

consultation for ongoing treatment recommendations and, possibly, to rule out 

central nervous system involvement.





Plan





(1) Anxiety


Most likely diagnosis is panic disorder, although at this point cannot rule out 

anxiety due to general medical condition (Lyme disease). 


Also rule out major depressive disorder.


Get collateral information from family (regarding his adult life, previous 

level of functioning prior to the past 10 weeks, they're understanding of why 

he has not been able to work for the past 6 years, if there was any worsening 

of symptoms after his head injury 13 years ago, and clarification of recent 

stressors, specifically why he was asked to stop volunteering).  Family meeting 

with parents.  They will need to be involved in discharge safety planning as 

well.


We briefly discussed medication options for panic, specifically an SSRI 

antidepressant.  The patient would like to try nonpharmacologic methods of 

handling his anxiety first.  Refer for outpatient therapy and psychiatric follow

-up.


Hydroxyzine as needed for sleep and anxiety.


Encourage participation in unit groups and programming.  Work on healthy coping 

skills.





11/7


-Patient continues to report high anxiety and poor sleep. He continues to 

refuse a trial of an SSRI, although he has been educated repeatedly about the 

risks, benefits and side effects. Provided him with an Up To Date patient 

handout about SSRIs and encouraged him to continue to educate himself and ask 

questions. If he agrees, will start escitalopram 10mg.


-Reviewed behavioral techniques he can utilize to manage anxiety. Avoiding 

benzos if possible, as patient states he does not want medication that will be 

sedating, but he has worsened throughout the day today and has approached staff 

multiple times asking for something prn for anxiety, doesn't feel hydroxyzine 

worked, so will try lorazepam 0.5mg q 6 hrs prn. 


-Patient is requesting a different medication for sleep.  We reviewed the risks

, benefits, and side effects of trazodone, including priapism, and he agreed to 

a trial.  I will order 50 mg daily at bedtime tonight, which can be repeated 

once if needed.





11/8/17


-The patient reports that he has responded favorably to Ativan, but remains 

anxious.  He has had no further panic episodes, however, we spent a great deal 

of time discussing a trial of SSRI, and today the patient tells me that he is 

inclined to try sertraline, prefers wishes to discuss this decision with his 

parents.  I was able to speak with the patient's father about this 

recommendation, and the patient's father was in agreement with the plan to use 

an SSRI, but wished to do some "online research" first.


- The patient tells me that he slept well with trazodone, and finds this to be 

a helpful medication.





11/9/17.


-- The patient reports that he has had no further panic attacks.  He also 

reports that he received substantial relief, but not complete relief, from 

lorazepam 0.5 mg every 6 hours.  He says that he is asking for it routinely 3 

times a day, but does not require it during the night.  The plan is to address 

the patient's depression and anxiety in a 2 pronged approach: Address the 

possible infectious disease component secondary to Lyme disease, and treat both 

the depression and anxiety with an SSRI (sertraline), together with lorazepam 

while we are titrating the dose of sertraline.





(2) Suicidal ideation


Every 15 minute checks for safety.


Work on healthy coping skills and discharge safety plan.





11/8/17


- Today, the patient says that he is not having active suicidal thoughts, but 

continues to say that he considers suicide to be an option in the event that 

his feelings of anxiety, depression, and his various physical complaints don't 

improve over time.  I was able to help him understand that this is "a bad time 

that will pass," and that "things will get better."  The patient said that this 

was reassuring and that he hopes one day to be able to talk to other people who 

are going through similar experiences in order to help them.  He agrees to 

notify staff should he develop any active suicidal thoughts.  He does not have 

any specific plan for suicide at this point.





11/9/17.





- The patient endorses what he refers to his overwhelming feelings of sadness, 

combined with high levels of anxiety, and multiple ongoing arthralgias and 

myalgias which are believed to be secondary to Lyme disease in his case.  Today

, he tells me that without substantial relief from his symptoms of depression, 

anxiety and physical complaints he will act on his thoughts of committing 

suicide although he contracts for safety in the hospital and says "I wouldn't 

do anything while I'm under treatment here."  The patient clearly continues to 

require inpatient psychiatric hospitalization





(3) Lyme disease


Continue home dose of doxycycline, and follow-up with outpatient provider as 

directed by emergency room staff.  He does not have a PCP, and states his 

parents were working on getting him medical assistance.





11/8/17 


- Both the REGIS and the Western blot tests have come back positive for Lyme 

disease.  The patient is taking doxycycline 100 mg twice a day.  I let him know 

that it is possible that his depression, and to a lesser extent, his anxiety 

may be attributable to Lyme disease.  However, he has had his physical symptoms 

for at least 6 years, and his symptoms of anxiety have emerged largely in the 

past 11 weeks, circumstance which suggests that there is more going on 

clinically than simply complications of Lyme disease.





11/9/17.


- Her plan is to obtain an infectious disease consultation in order to receive 

recommendations for treatment, as necessary, following his planned three-week 

course of doxycycline.  Also like to rule out, if possible, central nervous 

system involvement.








Discharge / Aftercare Planning


Therapist:  


   Name:  None


:  


   Name:  None





Visit Code


E&M Code:  46207





Inventory Assets


Strengths:


Has housing, supportive family


Needs:


Anxiety.  Depression.  Lyme Disease with multiple physical complaints.  Social 

isolation.





Risk Factors Assessment


Male:  Yes


:  Yes


/single/:  Yes


Higher / Fall in social status:  Yes


Health problems:  Yes


Mental Health Diagnoses:  Yes


Substance use disorders:  No


Previous attempt:  No


Previous psychiatric stay:  No


Hopelessness:  Yes


Smoker:  No





Protective Factors Assessment


Uatsdin beliefs:  No


:  No


Responsible for young children:  No


Employed:  No


Stable relationships:  No


Supportive family:  Yes


Good rapport with provider:  No





Data


Vital Signs Last 24 Hrs:











  Date Time  Temp Pulse Resp B/P (MAP) Pulse Ox O2 Delivery O2 Flow Rate FiO2


 


11/9/17 06:41 36.7 85 16 119/76    





  106  116/77    








Meds Administered Last 24 Hrs:





Meds Administered (Past 24Hrs)








 Medications


  (Trade)  Dose


 Ordered  Sig/Alexia


 Route  Start Time


 Stop Time Status Last Admin


Dose Admin


 


 Trazodone HCl


  (Desyrel Tab)  50 mg  HS


 PO  11/7/17 22:00


 12/7/17 21:59  11/8/17 21:33


50 MG


 


 Sertraline HCl


  (Zoloft Tab)  25 mg  QAM


 PO  11/9/17 09:00


 11/9/17 13:55 DC 11/9/17 09:05


25 MG

## 2017-11-10 NOTE — PSYCHIATRIC PROGRESS NOTES
Progress Note


Date of Service


Nov 10, 2017.





Interval History


Cain Zhou is a 32-year-old male who currently lives in [place], has no 

psychiatric history, and presented to the emergency room requesting psychiatric 

admission for stress, anxiety, and suicidal thoughts.  He was admitted on a 201 

voluntary commitment.





Chief Complaint


"I'm still depressed, but maybe a little better".





Subjective


Patient was seen & assessed interval progress reviewed with Treatment Team.  I 

met with the patient individually this morning in order to assess his current 

mental status, review his treatment plan with him, and address any issues and 

concerns that may arise.  The patient begins by telling me that although he 

slept well last night, he woke up feeling depressed and anxious.  He reports 

that the anxiety improves significantly when he took his morning dose of 

medications, and he notes that overall his mood may be "a little bit better."  

He has had no further panic episodes, and we note that the patient has been 

more outgoing and is forming friendships in the milieu.  Today, we were able to 

process a number of issues related to his sense of loss and "failure to launch.

"  He is notes that he has lost friendships, and the age of 32 feels that he is 

almost and suspended animation after losing so many years because of physical 

problems.  Of note is the fact that the patient says that his myalgias and 

arthralgias have improved, particularly in his hips and lower extremities.  His 

appetite is also been "better," and he has gained approximate 2 pounds since 

entering the hospital.  The patient reiterates that he has no plan to act on 

his suicidal thoughts in the hospital, and says that his real concern is that 

he will not be able to tolerate returning to his home without becoming suicidal

, unless his mood improves more significantly.  Dr. Erika Payan spoke today 

with a Dr. Martinez, Infectious Disease, who has reviewed the patient's case 

and is recommending an additional month of doxycycline administration of the 

current dose of 100 mg twice a day.  She is also recommending follow-up 

evaluation following discharge if the patient's symptoms do not resolve.





Sleep Information


Total Hours of Sleep:  6.50





Meal Information


Percent of Breakfast Consumed:  50


Percent of Lunch Consumed:  100


Percent of Dinner Consumed:  50





Mental Status Exam


During interview pt is:  alert and oriented, cooperative


Appearance:  appropriately dressed, appropriately groomed, other (large bump 

and scar on his right forehead)


Eye contact is:  good


Motor behavior is:  steady gait & station, psychomotor agitation (fidgeting)


Speech:  other (The patient's voice is delivered at a soft volume.)


Affect:  depressed (The patient appears slightly brighter and less anxious, but 

his affect clearly remains depressed.), anxious


Mood is:  depressed, anxious (He notes that his anxiety is now well controlled 

and he has had no panic episodes.)


Thought process:  linear, logical (The patient today is less obsessive.), clear

, coherent


Thought content:  preoccupation (with his somatic symptoms)


Suicidal thought are:  present (the patient has what might be referred to as 

future conditional suicidal thoughts.  He says that he might consider suicide 

in the future if his physical symptoms and related limitations don't improve.  

He denies current suicidal thoughts.), Plan: denied


Homicidal thoughts are:  denied


Hallucinations:  denies auditory, denies visual


Cognition:  memory grossly intact, attention grossly intact, language grossly 

intact


Intelligence estimated to be:  consistent with level of education, above average


Insight:  fair


Judgement:  fair





Impression


Today, the patient reports some small improvement in his mood.  He has been 

able to tolerate sertraline 50 mg daily, and we will "hold" at this dose for 

the next few days, and then consider increasing the dose as tolerated 200 mg 

daily, as indicated.  The patient also reports that he feels that his anxiety 

is under fairly good control at his current dose of Ativan, and he says that 

the increased dose has helped considerably.  Contrary to the patient's 

assertion at the time of admission, the patient is now saying that he realizes 

that his depression and anxiety are a greater problem than his physical symptoms

, and he is eager to continue treatment for both depression/anxiety and Lyme 

disease..





Plan





(1) Anxiety


Most likely diagnosis is panic disorder, although at this point cannot rule out 

anxiety due to general medical condition (Lyme disease). 


Also rule out major depressive disorder.


Get collateral information from family (regarding his adult life, previous 

level of functioning prior to the past 10 weeks, they're understanding of why 

he has not been able to work for the past 6 years, if there was any worsening 

of symptoms after his head injury 13 years ago, and clarification of recent 

stressors, specifically why he was asked to stop volunteering).  Family meeting 

with parents.  They will need to be involved in discharge safety planning as 

well.


We briefly discussed medication options for panic, specifically an SSRI 

antidepressant.  The patient would like to try nonpharmacologic methods of 

handling his anxiety first.  Refer for outpatient therapy and psychiatric follow

-up.


Hydroxyzine as needed for sleep and anxiety.


Encourage participation in unit groups and programming.  Work on healthy coping 

skills.





11/7


-Patient continues to report high anxiety and poor sleep. He continues to 

refuse a trial of an SSRI, although he has been educated repeatedly about the 

risks, benefits and side effects. Provided him with an Up To Date patient 

handout about SSRIs and encouraged him to continue to educate himself and ask 

questions. If he agrees, will start escitalopram 10mg.


-Reviewed behavioral techniques he can utilize to manage anxiety. Avoiding 

benzos if possible, as patient states he does not want medication that will be 

sedating, but he has worsened throughout the day today and has approached staff 

multiple times asking for something prn for anxiety, doesn't feel hydroxyzine 

worked, so will try lorazepam 0.5mg q 6 hrs prn. 


-Patient is requesting a different medication for sleep.  We reviewed the risks

, benefits, and side effects of trazodone, including priapism, and he agreed to 

a trial.  I will order 50 mg daily at bedtime tonight, which can be repeated 

once if needed.





11/8/17


-The patient reports that he has responded favorably to Ativan, but remains 

anxious.  He has had no further panic episodes, however, we spent a great deal 

of time discussing a trial of SSRI, and today the patient tells me that he is 

inclined to try sertraline, prefers wishes to discuss this decision with his 

parents.  I was able to speak with the patient's father about this 

recommendation, and the patient's father was in agreement with the plan to use 

an SSRI, but wished to do some "online research" first.


- The patient tells me that he slept well with trazodone, and finds this to be 

a helpful medication.





11/9/17.


-- The patient reports that he has had no further panic attacks.  He also 

reports that he received substantial relief, but not complete relief, from 

lorazepam 0.5 mg every 6 hours.  He says that he is asking for it routinely 3 

times a day, but does not require it during the night.  The plan is to address 

the patient's depression and anxiety in a 2 pronged approach: Address the 

possible infectious disease component secondary to Lyme disease, and treat both 

the depression and anxiety with an SSRI (sertraline), together with lorazepam 

while we are titrating the dose of sertraline.





11/10/17


-- The patient reports significantly reduced anxiety, and attributes this 

improvement to a combination of sertraline and lorazepam.  He has had no 

further panic episodes, and finds that his levels of anxiety or now manageable.

  He understands that the goal is to eventually taper and discontinue lorazepam 

wants he is at a standard dose of sertraline.  We are proceeding carefully with 

sertraline because of the patient's various physical complaints, and we want to 

avoid side effects that may discourage the patient from adherence.





(2) Suicidal ideation


Every 15 minute checks for safety.


Work on healthy coping skills and discharge safety plan.





11/8/17


- Today, the patient says that he is not having active suicidal thoughts, but 

continues to say that he considers suicide to be an option in the event that 

his feelings of anxiety, depression, and his various physical complaints don't 

improve over time.  I was able to help him understand that this is "a bad time 

that will pass," and that "things will get better."  The patient said that this 

was reassuring and that he hopes one day to be able to talk to other people who 

are going through similar experiences in order to help them.  He agrees to 

notify staff should he develop any active suicidal thoughts.  He does not have 

any specific plan for suicide at this point.





11/9/17.





- The patient endorses what he refers to his overwhelming feelings of sadness, 

combined with high levels of anxiety, and multiple ongoing arthralgias and 

myalgias which are believed to be secondary to Lyme disease in his case.  Today

, he tells me that without substantial relief from his symptoms of depression, 

anxiety and physical complaints he will act on his thoughts of committing 

suicide although he contracts for safety in the hospital and says "I wouldn't 

do anything while I'm under treatment here."  The patient clearly continues to 

require inpatient psychiatric hospitalization





11/10/17





- The patient tells me that he is not currently having suicidal thoughts, but 

feels that were he to return to the community at this point the suicidal 

thoughts with intent would potentially recur, in the absence of substantial 

improvement in his overwhelming feelings of depression, combined with ongoing 

myalgias and arthralgias.





(3) Lyme disease


Continue home dose of doxycycline, and follow-up with outpatient provider as 

directed by emergency room staff.  He does not have a PCP, and states his 

parents were working on getting him medical assistance.





11/8/17 


- Both the REGIS and the Western blot tests have come back positive for Lyme 

disease.  The patient is taking doxycycline 100 mg twice a day.  I let him know 

that it is possible that his depression, and to a lesser extent, his anxiety 

may be attributable to Lyme disease.  However, he has had his physical symptoms 

for at least 6 years, and his symptoms of anxiety have emerged largely in the 

past 11 weeks, circumstance which suggests that there is more going on 

clinically than simply complications of Lyme disease.





11/9/17.


- Her plan is to obtain an infectious disease consultation in order to receive 

recommendations for treatment, as necessary, following his planned three-week 

course of doxycycline.  Also like to rule out, if possible, central nervous 

system involvement.





11/10/17





- Dr. Payan spoke with Dr. Martinez of ID in order to obtain recommendations 

regarding the patient's Lyme Disease.  Dr. Martinez recommended continuing 

doxycycline 100 mg twice a day for an additional 1 month, with outpatient follow

-up as indicated by an absence of resolution of the patient's symptoms.








Discharge / Aftercare Planning


Therapist:  


   Name:  None


:  


   Name:  None





Visit Code


E&M Code:  89866





Inventory Assets


Strengths:


Has housing, supportive family


Needs:


Anxiety.  Depression.  Lyme Disease with multiple physical complaints.  Social 

isolation.





Risk Factors Assessment


Male:  Yes


:  Yes


/single/:  Yes


Higher / Fall in social status:  Yes


Health problems:  Yes


Mental Health Diagnoses:  Yes


Substance use disorders:  No


Previous attempt:  No


Previous psychiatric stay:  No


Hopelessness:  Yes


Smoker:  No





Protective Factors Assessment


Sikhism beliefs:  No


:  No


Responsible for young children:  No


Employed:  No


Stable relationships:  No


Supportive family:  Yes


Good rapport with provider:  No





Data


Vital Signs Last 24 Hrs:











  Date Time  Temp Pulse Resp B/P (MAP) Pulse Ox O2 Delivery O2 Flow Rate FiO2


 


11/10/17 06:35 36.5 77 16 125/77    





  96  129/94    








Meds Administered Last 24 Hrs:





Meds Administered (Past 24Hrs)








 Medications


  (Trade)  Dose


 Ordered  Sig/Alexia


 Route  Start Time


 Stop Time Status Last Admin


Dose Admin


 


 Sertraline HCl


  (Zoloft Tab)  25 mg  QAM


 PO  11/9/17 09:00


 11/9/17 13:55 DC 11/9/17 09:05


25 MG


 


 Lorazepam


  (Ativan Tab)  1 mg  TID


 PO  11/9/17 22:00


 12/9/17 21:59  11/10/17 07:58


1 MG


 


 Sertraline HCl


  (Zoloft Tab)  50 mg  QAM


 PO  11/10/17 09:00


 12/10/17 08:59  11/10/17 07:59


50 MG

## 2017-11-11 NOTE — PSYCHIATRIC PROGRESS NOTES
Progress Note


Date of Service


Nov 11, 2017.





Interval History


Cain Zhou is a 32-year-old male who currently lives in [place], has no 

psychiatric history, and presented to the emergency room requesting psychiatric 

admission for stress, anxiety, and suicidal thoughts.  He was admitted on a 201 

voluntary commitment.





Chief Complaint


"The meds are beginning to knock me out, I'm tired".





Subjective


Patient was seen & assessed interval progress reviewed with Nursing.  Pt was 

seen for interview along with Kamila Adams PA-C.  Pt requests that door be 

kept open due to anxiety with small office.  He reports he has only felt this 

way while on the unit.  Pt reports fatigue and feels like the "meds are 

beginning to knock me out".  Pt offers several reasons as to this including 

medication side effects, recent sleep deprivation, recent stressors, and "body 

fighting off the Lyme".  Pt explains recent stressors and states there is "6 

years of pain finally coming to the surface".  He continues to question what 

portions of his symptoms may be due to is "pretty severe" Lyme disease.  Pt 

states he depression is "stable" and that he feels good "when people are around

", but reports he has a harder time with his mood when he is alone and now that 

the milieu is "less energetic".  He states he plans out time to spend on his 

own to reflect on his mood and the improvements he has been making.  Pt denies 

SI currently.  He states groups have been helpful and he has noticed a slight 

improvement with medications, but feels there could be more.  Pt is concerned 

about long-term Ativan use, but states "for now I feel it's something that I 

still need".  Pt reports his sleep is better with Trazodone.  Appetite has 

improved and pt is hopeful about 2lb weight gain since admission.  He 

reportedly "eats until he feels sick", but has noticed improvement in his 

dietary intake.  He continues to focus on the ongoing pain symptoms related to 

his Lyme disease stating, "when I have the emotional pain, it helps me to not 

focus on the physical".





Review of Systems


Psych:  denies symptoms other than stated above


Constitutional: denied 


Cardiovascular: denied


GI: nausea with meals


Neurologic: denied


Remainder of 10 body systems also reviewed and denied other than noted above.





Sleep Information


Total Hours of Sleep:  6.00





Meal Information


Percent of Breakfast Consumed:  100


Percent of Lunch Consumed:  100


Percent of Dinner Consumed:  90





Mental Status Exam


During interview pt is:  alert and oriented, cooperative


Appearance:  appropriately dressed, appropriately groomed, other (large bump 

and scar on his right forehead)


Eye contact is:  good


Motor behavior is:  steady gait & station, psychomotor agitation (fidgeting 

with hands)


Speech:  normal in rate, rhythm & volume, is pressured (anxious with )


Affect:  depressed, anxious


Mood is:  depressed ("stable"), anxious


Thought process:  linear, logical, clear, coherent (thoughtful in word choice)


Thought content:  preoccupation (with pain from Lyme disease)


Suicidal thought are:  denied, Plan: denied


Homicidal thoughts are:  denied


Hallucinations:  denies auditory, denies visual


Cognition:  memory grossly intact, attention grossly intact, language grossly 

intact


Intelligence estimated to be:  consistent with level of education, above average


Insight:  fair


Judgement:  fair





Impression


Pt continues to report improvement in mood.  Pt is tolerating current dose of 

50mg of sertraline.  He is agreeable to increasing the dose in order to target 

both anxiety and depression.  Pt feels the Ativan has been helping currently to 

improve anxiety symptoms but would appreciate discontinuation once anxiety is 

better controlled on another medication.  He continues to focus on both 

psychiatric symptoms as well as physical symptoms of his Lyme disease, but is 

eager to continue with treatment for both.





Plan





(1) Anxiety


Most likely diagnosis is panic disorder, although at this point cannot rule out 

anxiety due to general medical condition (Lyme disease). 


Also rule out major depressive disorder.


Get collateral information from family (regarding his adult life, previous 

level of functioning prior to the past 10 weeks, they're understanding of why 

he has not been able to work for the past 6 years, if there was any worsening 

of symptoms after his head injury 13 years ago, and clarification of recent 

stressors, specifically why he was asked to stop volunteering).  Family meeting 

with parents.  They will need to be involved in discharge safety planning as 

well.


We briefly discussed medication options for panic, specifically an SSRI 

antidepressant.  The patient would like to try nonpharmacologic methods of 

handling his anxiety first.  Refer for outpatient therapy and psychiatric follow

-up.


Hydroxyzine as needed for sleep and anxiety.


Encourage participation in unit groups and programming.  Work on healthy coping 

skills.





11/7


-Patient continues to report high anxiety and poor sleep. He continues to 

refuse a trial of an SSRI, although he has been educated repeatedly about the 

risks, benefits and side effects. Provided him with an Up To Date patient 

handout about SSRIs and encouraged him to continue to educate himself and ask 

questions. If he agrees, will start escitalopram 10mg.


-Reviewed behavioral techniques he can utilize to manage anxiety. Avoiding 

benzos if possible, as patient states he does not want medication that will be 

sedating, but he has worsened throughout the day today and has approached staff 

multiple times asking for something prn for anxiety, doesn't feel hydroxyzine 

worked, so will try lorazepam 0.5mg q 6 hrs prn. 


-Patient is requesting a different medication for sleep.  We reviewed the risks

, benefits, and side effects of trazodone, including priapism, and he agreed to 

a trial.  I will order 50 mg daily at bedtime tonight, which can be repeated 

once if needed.





11/8/17


-The patient reports that he has responded favorably to Ativan, but remains 

anxious.  He has had no further panic episodes, however, we spent a great deal 

of time discussing a trial of SSRI, and today the patient tells me that he is 

inclined to try sertraline, prefers wishes to discuss this decision with his 

parents.  I was able to speak with the patient's father about this 

recommendation, and the patient's father was in agreement with the plan to use 

an SSRI, but wished to do some "online research" first.


- The patient tells me that he slept well with trazodone, and finds this to be 

a helpful medication.





11/9/17.


-- The patient reports that he has had no further panic attacks.  He also 

reports that he received substantial relief, but not complete relief, from 

lorazepam 0.5 mg every 6 hours.  He says that he is asking for it routinely 3 

times a day, but does not require it during the night.  The plan is to address 

the patient's depression and anxiety in a 2 pronged approach: Address the 

possible infectious disease component secondary to Lyme disease, and treat both 

the depression and anxiety with an SSRI (sertraline), together with lorazepam 

while we are titrating the dose of sertraline.





11/10/17


-- The patient reports significantly reduced anxiety, and attributes this 

improvement to a combination of sertraline and lorazepam.  He has had no 

further panic episodes, and finds that his levels of anxiety or now manageable.

  He understands that the goal is to eventually taper and discontinue lorazepam 

wants he is at a standard dose of sertraline.  We are proceeding carefully with 

sertraline because of the patient's various physical complaints, and we want to 

avoid side effects that may discourage the patient from adherence.





11/11/17


-- 





(2) Suicidal ideation


Every 15 minute checks for safety.


Work on healthy coping skills and discharge safety plan.





11/8/17


- Today, the patient says that he is not having active suicidal thoughts, but 

continues to say that he considers suicide to be an option in the event that 

his feelings of anxiety, depression, and his various physical complaints don't 

improve over time.  I was able to help him understand that this is "a bad time 

that will pass," and that "things will get better."  The patient said that this 

was reassuring and that he hopes one day to be able to talk to other people who 

are going through similar experiences in order to help them.  He agrees to 

notify staff should he develop any active suicidal thoughts.  He does not have 

any specific plan for suicide at this point.





11/9/17.





- The patient endorses what he refers to his overwhelming feelings of sadness, 

combined with high levels of anxiety, and multiple ongoing arthralgias and 

myalgias which are believed to be secondary to Lyme disease in his case.  Today

, he tells me that without substantial relief from his symptoms of depression, 

anxiety and physical complaints he will act on his thoughts of committing 

suicide although he contracts for safety in the hospital and says "I wouldn't 

do anything while I'm under treatment here."  The patient clearly continues to 

require inpatient psychiatric hospitalization





11/10/17





- The patient tells me that he is not currently having suicidal thoughts, but 

feels that were he to return to the community at this point the suicidal 

thoughts with intent would potentially recur, in the absence of substantial 

improvement in his overwhelming feelings of depression, combined with ongoing 

myalgias and arthralgias.





(3) Lyme disease


Continue home dose of doxycycline, and follow-up with outpatient provider as 

directed by emergency room staff.  He does not have a PCP, and states his 

parents were working on getting him medical assistance.





11/8/17 


- Both the REGIS and the Western blot tests have come back positive for Lyme 

disease.  The patient is taking doxycycline 100 mg twice a day.  I let him know 

that it is possible that his depression, and to a lesser extent, his anxiety 

may be attributable to Lyme disease.  However, he has had his physical symptoms 

for at least 6 years, and his symptoms of anxiety have emerged largely in the 

past 11 weeks, circumstance which suggests that there is more going on 

clinically than simply complications of Lyme disease.





11/9/17.


- Her plan is to obtain an infectious disease consultation in order to receive 

recommendations for treatment, as necessary, following his planned three-week 

course of doxycycline.  Also like to rule out, if possible, central nervous 

system involvement.





11/10/17





- Dr. Payan spoke with Dr. Martinez of ID in order to obtain recommendations 

regarding the patient's Lyme Disease.  Dr. Martinez recommended continuing 

doxycycline 100 mg twice a day for an additional 1 month, with outpatient follow

-up as indicated by an absence of resolution of the patient's symptoms.








Discharge / Aftercare Planning


Primary Care Physician:  


   Name:  Dr Marie


   Phone Number:  [phone]


   Date of Appointment:  Nov 21, 2017


   Time of Appointment:  1:45pm


Psychiatrist:  


   Name:  [facility]


   Phone Number:  [phone]


   Date of Appointment:  Nov 27, 2017


   Time of Appointment:  100pm


   Appointment Notes:  intake with Shagufta Elmore


Therapist:  


   Name:  [facility]


   Phone Number:  [phone]


   Appointment Notes:  will refer after intake


:  


   Name:  None





Inventory Assets


Strengths:


Has housing, supportive family


Needs:


Anxiety.  Depression.  Lyme Disease with multiple physical complaints.  Social 

isolation.





Risk Factors Assessment


Male:  Yes


:  Yes


/single/:  Yes


Higher / Fall in social status:  Yes


Health problems:  Yes


Mental Health Diagnoses:  Yes


Substance use disorders:  No


Previous attempt:  No


Previous psychiatric stay:  No


Hopelessness:  Yes


Smoker:  No





Protective Factors Assessment


Tenriism beliefs:  No


:  No


Responsible for young children:  No


Employed:  No


Stable relationships:  No


Supportive family:  Yes


Good rapport with provider:  No





Data


Vital Signs Last 24 Hrs:











  Date Time  Temp Pulse Resp B/P (MAP) Pulse Ox O2 Delivery O2 Flow Rate FiO2


 


11/11/17 06:54 36.6 75 16 131/82    





  76  134/89    








Meds Administered Last 24 Hrs:





Meds Administered (Past 24Hrs)








 Medications


  (Trade)  Dose


 Ordered  Sig/Alexia


 Route  Start Time


 Stop Time Status Last Admin


Dose Admin


 


 Lorazepam


  (Ativan Tab)  1 mg  TID


 PO  11/9/17 22:00


 12/9/17 21:59  11/11/17 14:01


1 MG


 


 Sertraline HCl


  (Zoloft Tab)  50 mg  QAM


 PO  11/10/17 09:00


 11/11/17 13:17 DC 11/11/17 08:50


50 MG

## 2017-11-11 NOTE — PSYCHIATRIC PROGRESS NOTES
Progress Note


Date of Service


Nov 11, 2017.





Interval History


Cain Zhou is a 32-year-old male who currently lives in [place], has no 

psychiatric history, and presented to the emergency room requesting psychiatric 

admission for stress, anxiety, and suicidal thoughts.  He was admitted on a 201 

voluntary commitment.





Chief Complaint


"a little tired".





Subjective


Patient was seen & assessed interval progress reviewed with  nursing.  Pt 

endorsed some mild improvements with his anxiety, depression and racing 

thoughts. He is sleeping better. He endorsed some fatigue that he attributes to 

dealing with his emotional and physical concerns. He finds the processes 

draining. also he wonders if having some tiredness form his medication but 

wants to continue them since finds this tolerable and hoping to have them more 

effective over time. He is aiming to  come off ativan  in near future though.  

He is comfortable with further titration of zoloft to wards a more full dosage.

  He denied SI or HI. He denied manic symptoms.  He is not having bothersome 

dreams the past few nights.   he endorsed impaired appettite but making himself 

eat his meals fully with aim to regain his wt. He is having an upset stomach. 


seeking door open to office room since gets anxious in small rooms while here





Review of Systems


Constitutional:  + weight loss (but feels is regaining his wt some since here), 

No fever, No chills, No sweats, No weakness, No fatigue, No problem reported


Respiratory:  No cough, No sputum, No wheezing, No shortness of breath, No 

dyspnea on exertion, No dyspnea at rest, No hemoptysis, No problem reported


Cardiovascular:  No chest pain, No orthopnea, No PND, No edema, No claudication

, No palpitations, No problem reported


Abdomen:  + nausea


Psychiatric:  + depression symptoms, + anxiety, + insomnia (but treated with 

trazodone )





Sleep Information


Total Hours of Sleep:  6.00





Meal Information


Percent of Breakfast Consumed:  100


Percent of Lunch Consumed:  100


Percent of Dinner Consumed:  90





Mental Status Exam


During interview pt is:  alert and oriented, cooperative


Appearance:  appropriately dressed, appropriately groomed, other (large bump 

and scar on his right forehead)


Eye contact is:  good


Motor behavior is:  steady gait & station, psychomotor agitation (fidgetly )


Speech:  other (soft but mild anxious like pressure to speech with edecreased 

latency of speech onset as well )


Affect:  anxious


Mood is:  anxious (He notes that his anxiety is now well controlled and he has 

had no panic episodes.)


Thought process:  linear, logical (some obessiveness ongoing ), clear, coherent


Thought content:  preoccupation (with his somatic symptoms)


Suicidal thought are:  present (the patient has what might be referred to as 

future conditional suicidal thoughts.  He says that he might consider suicide 

in the future if his physical symptoms and related limitations don't improve.  

He denies current suicidal thoughts.), Plan: denied


Homicidal thoughts are:  denied


Hallucinations:  denies auditory, denies visual


Cognition:  memory grossly intact, attention grossly intact, language grossly 

intact


Intelligence estimated to be:  consistent with level of education, above average


Insight:  fair


Judgement:  fair





Impression


Today, the patient reports some small improvement in his mood.  He has been 

able to tolerate sertraline 50 mg daily, and we will "hold" at this dose for 

the next few days, and then consider increasing the dose as tolerated 200 mg 

daily, as indicated.  The patient also reports that he feels that his anxiety 

is under fairly good control at his current dose of Ativan, and he says that 

the increased dose has helped considerably.  Contrary to the patient's 

assertion at the time of admission, the patient is now saying that he realizes 

that his depression and anxiety are a greater problem than his physical symptoms

, and he is eager to continue treatment for both depression/anxiety and Lyme 

disease..





Plan





(1) Anxiety


Most likely diagnosis is panic disorder, although at this point cannot rule out 

anxiety due to general medical condition (Lyme disease). 


Also rule out major depressive disorder.


Get collateral information from family (regarding his adult life, previous 

level of functioning prior to the past 10 weeks, they're understanding of why 

he has not been able to work for the past 6 years, if there was any worsening 

of symptoms after his head injury 13 years ago, and clarification of recent 

stressors, specifically why he was asked to stop volunteering).  Family meeting 

with parents.  They will need to be involved in discharge safety planning as 

well.


We briefly discussed medication options for panic, specifically an SSRI 

antidepressant.  The patient would like to try nonpharmacologic methods of 

handling his anxiety first.  Refer for outpatient therapy and psychiatric follow

-up.


Hydroxyzine as needed for sleep and anxiety.


Encourage participation in unit groups and programming.  Work on healthy coping 

skills.





11/7


-Patient continues to report high anxiety and poor sleep. He continues to 

refuse a trial of an SSRI, although he has been educated repeatedly about the 

risks, benefits and side effects. Provided him with an Up To Date patient 

handout about SSRIs and encouraged him to continue to educate himself and ask 

questions. If he agrees, will start escitalopram 10mg.


-Reviewed behavioral techniques he can utilize to manage anxiety. Avoiding 

benzos if possible, as patient states he does not want medication that will be 

sedating, but he has worsened throughout the day today and has approached staff 

multiple times asking for something prn for anxiety, doesn't feel hydroxyzine 

worked, so will try lorazepam 0.5mg q 6 hrs prn. 


-Patient is requesting a different medication for sleep.  We reviewed the risks

, benefits, and side effects of trazodone, including priapism, and he agreed to 

a trial.  I will order 50 mg daily at bedtime tonight, which can be repeated 

once if needed.





11/8/17


-The patient reports that he has responded favorably to Ativan, but remains 

anxious.  He has had no further panic episodes, however, we spent a great deal 

of time discussing a trial of SSRI, and today the patient tells me that he is 

inclined to try sertraline, prefers wishes to discuss this decision with his 

parents.  I was able to speak with the patient's father about this 

recommendation, and the patient's father was in agreement with the plan to use 

an SSRI, but wished to do some "online research" first.


- The patient tells me that he slept well with trazodone, and finds this to be 

a helpful medication.





11/9/17.


-- The patient reports that he has had no further panic attacks.  He also 

reports that he received substantial relief, but not complete relief, from 

lorazepam 0.5 mg every 6 hours.  He says that he is asking for it routinely 3 

times a day, but does not require it during the night.  The plan is to address 

the patient's depression and anxiety in a 2 pronged approach: Address the 

possible infectious disease component secondary to Lyme disease, and treat both 

the depression and anxiety with an SSRI (sertraline), together with lorazepam 

while we are titrating the dose of sertraline.





11/10/17


-- The patient reports significantly reduced anxiety, and attributes this 

improvement to a combination of sertraline and lorazepam.  He has had no 

further panic episodes, and finds that his levels of anxiety or now manageable.

  He understands that the goal is to eventually taper and discontinue lorazepam 

wants he is at a standard dose of sertraline.  We are proceeding carefully with 

sertraline because of the patient's various physical complaints, and we want to 

avoid side effects that may discourage the patient from adherence.





11/11/17 


---raised zoloft to 75mg for 11/12 dose,  maintained ativan 1mg tid for now 

with plan to wean down dose as further improvement occurs, maintained trazodone 

at 50mg for insomnia at bedtime 





(2) Suicidal ideation


Every 15 minute checks for safety.


Work on healthy coping skills and discharge safety plan.





11/8/17


- Today, the patient says that he is not having active suicidal thoughts, but 

continues to say that he considers suicide to be an option in the event that 

his feelings of anxiety, depression, and his various physical complaints don't 

improve over time.  I was able to help him understand that this is "a bad time 

that will pass," and that "things will get better."  The patient said that this 

was reassuring and that he hopes one day to be able to talk to other people who 

are going through similar experiences in order to help them.  He agrees to 

notify staff should he develop any active suicidal thoughts.  He does not have 

any specific plan for suicide at this point.





11/9/17.





- The patient endorses what he refers to his overwhelming feelings of sadness, 

combined with high levels of anxiety, and multiple ongoing arthralgias and 

myalgias which are believed to be secondary to Lyme disease in his case.  Today

, he tells me that without substantial relief from his symptoms of depression, 

anxiety and physical complaints he will act on his thoughts of committing 

suicide although he contracts for safety in the hospital and says "I wouldn't 

do anything while I'm under treatment here."  The patient clearly continues to 

require inpatient psychiatric hospitalization





11/10/17





- The patient tells me that he is not currently having suicidal thoughts, but 

feels that were he to return to the community at this point the suicidal 

thoughts with intent would potentially recur, in the absence of substantial 

improvement in his overwhelming feelings of depression, combined with ongoing 

myalgias and arthralgias.





(3) Lyme disease


Continue home dose of doxycycline, and follow-up with outpatient provider as 

directed by emergency room staff.  He does not have a PCP, and states his 

parents were working on getting him medical assistance.





11/8/17 


- Both the REGIS and the Western blot tests have come back positive for Lyme 

disease.  The patient is taking doxycycline 100 mg twice a day.  I let him know 

that it is possible that his depression, and to a lesser extent, his anxiety 

may be attributable to Lyme disease.  However, he has had his physical symptoms 

for at least 6 years, and his symptoms of anxiety have emerged largely in the 

past 11 weeks, circumstance which suggests that there is more going on 

clinically than simply complications of Lyme disease.





11/9/17.


- Her plan is to obtain an infectious disease consultation in order to receive 

recommendations for treatment, as necessary, following his planned three-week 

course of doxycycline.  Also like to rule out, if possible, central nervous 

system involvement.





11/10/17





- Dr. Payan spoke with Dr. Martinez of ID in order to obtain recommendations 

regarding the patient's Lyme Disease.  Dr. Martinez recommended continuing 

doxycycline 100 mg twice a day for an additional 1 month, with outpatient follow

-up as indicated by an absence of resolution of the patient's symptoms.








Discharge / Aftercare Planning


Primary Care Physician:  


   Name:  Dr Marie


   Phone Number:  [phone]


   Date of Appointment:  Nov 21, 2017


   Time of Appointment:  1:45pm


Psychiatrist:  


   Name:  [facility]


   Phone Number:  [phone]


   Date of Appointment:  Nov 27, 2017


   Time of Appointment:  100pm


   Appointment Notes:  intake with Shagufta Elmore


Therapist:  


   Name:  [facility]


   Phone Number:  [phone]


   Appointment Notes:  will refer after intake


:  


   Name:  None





Inventory Assets


Strengths:


Has housing, supportive family


Needs:


Anxiety.  Depression.  Lyme Disease with multiple physical complaints.  Social 

isolation.





Risk Factors Assessment


Male:  Yes


:  Yes


/single/:  Yes


Higher / Fall in social status:  Yes


Health problems:  Yes


Mental Health Diagnoses:  Yes


Substance use disorders:  No


Previous attempt:  No


Previous psychiatric stay:  No


Hopelessness:  Yes


Smoker:  No





Protective Factors Assessment


Pentecostalism beliefs:  No


:  No


Responsible for young children:  No


Employed:  No


Stable relationships:  No


Supportive family:  Yes


Good rapport with provider:  No





Data


Vital Signs Last 24 Hrs:











  Date Time  Temp Pulse Resp B/P (MAP) Pulse Ox O2 Delivery O2 Flow Rate FiO2


 


11/11/17 06:54 36.6 75 16 131/82    





  76  134/89    








Meds Administered Last 24 Hrs:





Meds Administered (Past 24Hrs)








 Medications


  (Trade)  Dose


 Ordered  Sig/Alexia


 Route  Start Time


 Stop Time Status Last Admin


Dose Admin


 


 Lorazepam


  (Ativan Tab)  1 mg  TID


 PO  11/9/17 22:00


 12/9/17 21:59  11/11/17 14:01


1 MG


 


 Sertraline HCl


  (Zoloft Tab)  50 mg  QAM


 PO  11/10/17 09:00


 11/11/17 13:17 DC 11/11/17 08:50


50 MG

## 2017-11-12 NOTE — PSYCHIATRIC PROGRESS NOTES
Progress Note


Date of Service


Nov 12, 2017.





Interval History


Cain Zhou is a 32-year-old male who currently lives in [place], has no 

psychiatric history, and presented to the emergency room requesting psychiatric 

admission for stress, anxiety, and suicidal thoughts.  He was admitted on a 201 

voluntary commitment.





Chief Complaint


"feel a bit hazy".





Subjective


Patient was seen & assessed interval progress reviewed with nursing.   pt 

reports anxiety spikes as awakens but then settles down and is a 2 out of 10 in 

terms of anxiety (higher worse) during time of assessment which was about half 

hour prior to being due for 2nd ativan dose.  Pt feels comfortable with his 

medications and denied s/e to Zoloft being increased and of being on current 

meds besides some fatigue/cloudiness which could be s/e of ativan. 


Pt denied SI or HI.  endorsed some improvement to his depression.  pt appears 

to be benefiting to the socialization aspects of the hospital course.  appetite 

similar,   gets upset stomach as eats his meals similar in intensity to prior 

days. sleeping well,  denied other s/e





Review of Systems


Abdomen:  + nausea


Psychiatric:  + depression symptoms, + anxiety





Sleep Information


Total Hours of Sleep:  6.50





Meal Information


Percent of Breakfast Consumed:  50


Percent of Lunch Consumed:  50


Percent of Dinner Consumed:  100





Mental Status Exam


During interview pt is:  alert and oriented, cooperative


Appearance:  appropriately dressed, appropriately groomed, other (large bump 

and scar on his right forehead)


Eye contact is:  good


Motor behavior is:  steady gait & station, no abnormal motor movements


Speech:  normal in rate, rhythm & volume, other


Affect:  anxious


Mood is:  anxious (He notes that his anxiety is now well controlled and he has 

had no panic episodes.)


Thought process:  linear, logical, clear, coherent


Thought content:  preoccupation (with his somatic symptoms)


Suicidal thought are:  denied, Plan: denied


Homicidal thoughts are:  denied


Hallucinations:  denies auditory, denies visual


Cognition:  memory grossly intact, attention grossly intact, language grossly 

intact


Intelligence estimated to be:  consistent with level of education, above average


Insight:  fair


Judgement:  fair





Impression


Today, the patient reports some small improvement in his mood.  He has been 

able to tolerate sertraline 50 mg daily, and we will "hold" at this dose for 

the next few days, and then consider increasing the dose as tolerated 200 mg 

daily, as indicated.  The patient also reports that he feels that his anxiety 

is under fairly good control at his current dose of Ativan, and he says that 

the increased dose has helped considerably.  Contrary to the patient's 

assertion at the time of admission, the patient is now saying that he realizes 

that his depression and anxiety are a greater problem than his physical symptoms

, and he is eager to continue treatment for both depression/anxiety and Lyme 

disease..





Plan





(1) Anxiety


Most likely diagnosis is panic disorder, although at this point cannot rule out 

anxiety due to general medical condition (Lyme disease). 


Also rule out major depressive disorder.


Get collateral information from family (regarding his adult life, previous 

level of functioning prior to the past 10 weeks, they're understanding of why 

he has not been able to work for the past 6 years, if there was any worsening 

of symptoms after his head injury 13 years ago, and clarification of recent 

stressors, specifically why he was asked to stop volunteering).  Family meeting 

with parents.  They will need to be involved in discharge safety planning as 

well.


We briefly discussed medication options for panic, specifically an SSRI 

antidepressant.  The patient would like to try nonpharmacologic methods of 

handling his anxiety first.  Refer for outpatient therapy and psychiatric follow

-up.


Hydroxyzine as needed for sleep and anxiety.


Encourage participation in unit groups and programming.  Work on healthy coping 

skills.





11/7


-Patient continues to report high anxiety and poor sleep. He continues to 

refuse a trial of an SSRI, although he has been educated repeatedly about the 

risks, benefits and side effects. Provided him with an Up To Date patient 

handout about SSRIs and encouraged him to continue to educate himself and ask 

questions. If he agrees, will start escitalopram 10mg.


-Reviewed behavioral techniques he can utilize to manage anxiety. Avoiding 

benzos if possible, as patient states he does not want medication that will be 

sedating, but he has worsened throughout the day today and has approached staff 

multiple times asking for something prn for anxiety, doesn't feel hydroxyzine 

worked, so will try lorazepam 0.5mg q 6 hrs prn. 


-Patient is requesting a different medication for sleep.  We reviewed the risks

, benefits, and side effects of trazodone, including priapism, and he agreed to 

a trial.  I will order 50 mg daily at bedtime tonight, which can be repeated 

once if needed.





11/8/17


-The patient reports that he has responded favorably to Ativan, but remains 

anxious.  He has had no further panic episodes, however, we spent a great deal 

of time discussing a trial of SSRI, and today the patient tells me that he is 

inclined to try sertraline, prefers wishes to discuss this decision with his 

parents.  I was able to speak with the patient's father about this 

recommendation, and the patient's father was in agreement with the plan to use 

an SSRI, but wished to do some "online research" first.


- The patient tells me that he slept well with trazodone, and finds this to be 

a helpful medication.





11/9/17.


-- The patient reports that he has had no further panic attacks.  He also 

reports that he received substantial relief, but not complete relief, from 

lorazepam 0.5 mg every 6 hours.  He says that he is asking for it routinely 3 

times a day, but does not require it during the night.  The plan is to address 

the patient's depression and anxiety in a 2 pronged approach: Address the 

possible infectious disease component secondary to Lyme disease, and treat both 

the depression and anxiety with an SSRI (sertraline), together with lorazepam 

while we are titrating the dose of sertraline.





11/10/17


-- The patient reports significantly reduced anxiety, and attributes this 

improvement to a combination of sertraline and lorazepam.  He has had no 

further panic episodes, and finds that his levels of anxiety or now manageable.

  He understands that the goal is to eventually taper and discontinue lorazepam 

wants he is at a standard dose of sertraline.  We are proceeding carefully with 

sertraline because of the patient's various physical complaints, and we want to 

avoid side effects that may discourage the patient from adherence.





11/11/17 


---raised zoloft to 75mg for 11/12 dose,  maintained ativan 1mg tid for now 

with plan to wean down dose as further improvement occurs, maintained trazodone 

at 50mg for insomnia at bedtime 





11/12/17  raised zoloft to 100mg for 11/13 dose. lower ativan to 1mg 9am and  

0.5mg 2pm and 1mg 9pm for today, with room to lower ativan further tomorrow if 

anxiety not worsened with this med adjustment. pt could envision holding 2pm 

dose on 11/13 if tolerating lowering of 2pm dose today well.  





(2) Suicidal ideation


Every 15 minute checks for safety.


Work on healthy coping skills and discharge safety plan.





11/8/17


- Today, the patient says that he is not having active suicidal thoughts, but 

continues to say that he considers suicide to be an option in the event that 

his feelings of anxiety, depression, and his various physical complaints don't 

improve over time.  I was able to help him understand that this is "a bad time 

that will pass," and that "things will get better."  The patient said that this 

was reassuring and that he hopes one day to be able to talk to other people who 

are going through similar experiences in order to help them.  He agrees to 

notify staff should he develop any active suicidal thoughts.  He does not have 

any specific plan for suicide at this point.





11/9/17.





- The patient endorses what he refers to his overwhelming feelings of sadness, 

combined with high levels of anxiety, and multiple ongoing arthralgias and 

myalgias which are believed to be secondary to Lyme disease in his case.  Today

, he tells me that without substantial relief from his symptoms of depression, 

anxiety and physical complaints he will act on his thoughts of committing 

suicide although he contracts for safety in the hospital and says "I wouldn't 

do anything while I'm under treatment here."  The patient clearly continues to 

require inpatient psychiatric hospitalization





11/10/17





- The patient tells me that he is not currently having suicidal thoughts, but 

feels that were he to return to the community at this point the suicidal 

thoughts with intent would potentially recur, in the absence of substantial 

improvement in his overwhelming feelings of depression, combined with ongoing 

myalgias and arthralgias.





(3) Lyme disease


Continue home dose of doxycycline, and follow-up with outpatient provider as 

directed by emergency room staff.  He does not have a PCP, and states his 

parents were working on getting him medical assistance.





11/8/17 


- Both the REGIS and the Western blot tests have come back positive for Lyme 

disease.  The patient is taking doxycycline 100 mg twice a day.  I let him know 

that it is possible that his depression, and to a lesser extent, his anxiety 

may be attributable to Lyme disease.  However, he has had his physical symptoms 

for at least 6 years, and his symptoms of anxiety have emerged largely in the 

past 11 weeks, circumstance which suggests that there is more going on 

clinically than simply complications of Lyme disease.





11/9/17.


- Her plan is to obtain an infectious disease consultation in order to receive 

recommendations for treatment, as necessary, following his planned three-week 

course of doxycycline.  Also like to rule out, if possible, central nervous 

system involvement.





11/10/17





- Dr. Payan spoke with Dr. Martinez of ID in order to obtain recommendations 

regarding the patient's Lyme Disease.  Dr. Martinez recommended continuing 

doxycycline 100 mg twice a day for an additional 1 month, with outpatient follow

-up as indicated by an absence of resolution of the patient's symptoms.








Discharge / Aftercare Planning


Primary Care Physician:  


   Name:  Dr Marie


   Phone Number:  [phone]


   Date of Appointment:  Nov 21, 2017


   Time of Appointment:  1:45pm


Psychiatrist:  


   Name:  [facility]- [address] [facility], [place]


   Phone Number:  [phone]


   Date of Appointment:  Nov 27, 2017


   Time of Appointment:  100pm


   Appointment Notes:  intake with Shagufta Elmore


Therapist:  


   Name:  [facility]


   Phone Number:  [phone]


   Appointment Notes:  will refer after intake


:  


   Name:  None





Visit Code


E&M Code:  59788





Inventory Assets


Strengths:


Has housing, supportive family


Needs:


Anxiety.  Depression.  Lyme Disease with multiple physical complaints.  Social 

isolation.





Risk Factors Assessment


Male:  Yes


:  Yes


/single/:  Yes


Higher / Fall in social status:  Yes


Health problems:  Yes


Mental Health Diagnoses:  Yes


Substance use disorders:  No


Previous attempt:  No


Previous psychiatric stay:  No


Hopelessness:  Yes


Smoker:  No





Protective Factors Assessment


Druze beliefs:  No


:  No


Responsible for young children:  No


Employed:  No


Stable relationships:  No


Supportive family:  Yes


Good rapport with provider:  No





Data


Vital Signs Last 24 Hrs:











  Date Time  Temp Pulse Resp B/P (MAP) Pulse Ox O2 Delivery O2 Flow Rate FiO2


 


11/12/17 06:55 36.6 74 16 122/74    





  99  127/74    








Meds Administered Last 24 Hrs:





Meds Administered (Past 24Hrs)








 Medications


  (Trade)  Dose


 Ordered  Sig/Alexia


 Route  Start Time


 Stop Time Status Last Admin


Dose Admin


 


 Sertraline HCl


  (Zoloft Tab)  75 mg  QAM


 PO  11/12/17 09:00


 11/12/17 13:36 DC 11/12/17 09:03


75 MG


 


 Lorazepam


  (Ativan Tab)  0.5 mg  QD@1400


 PO  11/12/17 14:00


 12/12/17 13:59  11/12/17 14:00


0.5 MG

## 2017-11-13 NOTE — PSYCHIATRIC PROGRESS NOTES
Progress Note


Date of Service


Nov 13, 2017.





Interval History


Cain Zhou is a 32-year-old male who currently lives in [place], has no 

psychiatric history, and presented to the emergency room requesting psychiatric 

admission for stress, anxiety, and suicidal thoughts.  He was admitted on a 201 

voluntary commitment.





Chief Complaint


"Well we've been doing a lot of things with the meds".





Subjective


Patient was seen & assessed interval progress reviewed with Treatment Team.  

Staff report he has been bright and jovial, seems to enjoy interacting with 

peers, and is attending unit programming.  He is stating that he doesn't feel 

ready to leave the hospital and would like to stay for a long time. Today the 

patient reports he has improved, thinks meds are helping, but "sometimes I feel 

lifeless inside." He says mood is "really good" when he is around his peers, 

playing games, but when he is alone he feels "like I've lost the will to live." 

He denies suicidal thoughts here, but says he doesn't feel ready to leave, as 

he is easily overwhelmed. He says he has not worked on a plan to increase his 

socialization at home, because he feels too poorly. He is interested in psych 

rehab if it is an option. He admits to some mild sedation from Ativan, but 

likes it as he feels calmer, and thinks it is helping. He denies side effects 

to sertraline. He was encouraged to work on a plan for increasing his 

socialization at home, as he has found it helpful for mood and anxiety to be 

around others here. He wondered if he could volunteer at [employer], so that 

he could go there and "just hang out."





Sleep Information


Total Hours of Sleep:  7.00





Meal Information


Percent of Breakfast Consumed:  100


Percent of Lunch Consumed:  50


Percent of Dinner Consumed:  50





Mental Status Exam


During interview pt is:  alert and oriented, cooperative


Appearance:  appropriately dressed, appropriately groomed, other (large bump 

and scar on his right forehead)


Eye contact is:  good


Motor behavior is:  steady gait & station, no abnormal motor movements


Speech:  normal in rate, rhythm & volume


Affect:  euthymic, anxious (improved from last week)


Mood is:  other ("better")


Thought process:  linear, logical, clear, coherent


Thought content:  preoccupation (with his somatic symptoms)


Suicidal thought are:  denied, Plan: denied


Homicidal thoughts are:  denied


Hallucinations:  denies auditory, denies visual


Cognition:  memory grossly intact, attention grossly intact, language grossly 

intact


Intelligence estimated to be:  consistent with level of education, above average


Insight:  fair


Judgement:  fair





Impression


Continues to slowly improve with respect to mood and anxiety.  Now on 100 mg of 

sertraline and 2.5mg or lorazepam in divided dosing.  Contrary to the patient's 

assertion at the time of admission, the patient is now saying that he realizes 

that his depression and anxiety are a greater problem than his physical symptoms

, and he is eager to continue treatment for both depression/anxiety and Lyme 

disease.  He will need follow up with psychiatry and a general practitioner 

regarding his Lyme treatment.  He would benefit from a plan to increase his 

socialization at home.





Plan





(1) Anxiety


Most likely diagnosis is panic disorder, although at this point cannot rule out 

anxiety due to general medical condition (Lyme disease). 


Also rule out major depressive disorder.


Get collateral information from family (regarding his adult life, previous 

level of functioning prior to the past 10 weeks, they're understanding of why 

he has not been able to work for the past 6 years, if there was any worsening 

of symptoms after his head injury 13 years ago, and clarification of recent 

stressors, specifically why he was asked to stop volunteering).  Family meeting 

with parents.  They will need to be involved in discharge safety planning as 

well.


We briefly discussed medication options for panic, specifically an SSRI 

antidepressant.  The patient would like to try nonpharmacologic methods of 

handling his anxiety first.  Refer for outpatient therapy and psychiatric follow

-up.


Hydroxyzine as needed for sleep and anxiety.


Encourage participation in unit groups and programming.  Work on healthy coping 

skills.





11/7


-Patient continues to report high anxiety and poor sleep. He continues to 

refuse a trial of an SSRI, although he has been educated repeatedly about the 

risks, benefits and side effects. Provided him with an Up To Date patient 

handout about SSRIs and encouraged him to continue to educate himself and ask 

questions. If he agrees, will start escitalopram 10mg.


-Reviewed behavioral techniques he can utilize to manage anxiety. Avoiding 

benzos if possible, as patient states he does not want medication that will be 

sedating, but he has worsened throughout the day today and has approached staff 

multiple times asking for something prn for anxiety, doesn't feel hydroxyzine 

worked, so will try lorazepam 0.5mg q 6 hrs prn. 


-Patient is requesting a different medication for sleep.  We reviewed the risks

, benefits, and side effects of trazodone, including priapism, and he agreed to 

a trial.  I will order 50 mg daily at bedtime tonight, which can be repeated 

once if needed.





11/8/17


-The patient reports that he has responded favorably to Ativan, but remains 

anxious.  He has had no further panic episodes, however, we spent a great deal 

of time discussing a trial of SSRI, and today the patient tells me that he is 

inclined to try sertraline, prefers wishes to discuss this decision with his 

parents.  I was able to speak with the patient's father about this 

recommendation, and the patient's father was in agreement with the plan to use 

an SSRI, but wished to do some "online research" first.


- The patient tells me that he slept well with trazodone, and finds this to be 

a helpful medication.





11/9/17.


-- The patient reports that he has had no further panic attacks.  He also 

reports that he received substantial relief, but not complete relief, from 

lorazepam 0.5 mg every 6 hours.  He says that he is asking for it routinely 3 

times a day, but does not require it during the night.  The plan is to address 

the patient's depression and anxiety in a 2 pronged approach: Address the 

possible infectious disease component secondary to Lyme disease, and treat both 

the depression and anxiety with an SSRI (sertraline), together with lorazepam 

while we are titrating the dose of sertraline.





11/10/17


-- The patient reports significantly reduced anxiety, and attributes this 

improvement to a combination of sertraline and lorazepam.  He has had no 

further panic episodes, and finds that his levels of anxiety or now manageable.

  He understands that the goal is to eventually taper and discontinue lorazepam 

wants he is at a standard dose of sertraline.  We are proceeding carefully with 

sertraline because of the patient's various physical complaints, and we want to 

avoid side effects that may discourage the patient from adherence.





11/11/17 


-- raised zoloft to 75mg for 11/12 dose,  maintained ativan 1mg tid for now 

with plan to wean down dose as further improvement occurs, maintained trazodone 

at 50mg for insomnia at bedtime 





11/12/17  


-- raised zoloft to 100mg for 11/13 dose. lower ativan to 1mg 9am and  0.5mg 

2pm and 1mg 9pm for today, with room to lower ativan further tomorrow if 

anxiety not worsened with this med adjustment. pt could envision holding 2pm 

dose on 11/13 if tolerating lowering of 2pm dose today well.  





11/13


-- Continue current medications, and refer for outpatient follow-up with a 

psychiatrist and therapist (referred to [facility]).  Also explore options for psych 

rehabilitation or clubhouse, as he would benefit from increased socialization 

and feels the programming here has been helpful.





(2) Suicidal ideation


Every 15 minute checks for safety.


Work on healthy coping skills and discharge safety plan.





11/8/17


- Today, the patient says that he is not having active suicidal thoughts, but 

continues to say that he considers suicide to be an option in the event that 

his feelings of anxiety, depression, and his various physical complaints don't 

improve over time.  I was able to help him understand that this is "a bad time 

that will pass," and that "things will get better."  The patient said that this 

was reassuring and that he hopes one day to be able to talk to other people who 

are going through similar experiences in order to help them.  He agrees to 

notify staff should he develop any active suicidal thoughts.  He does not have 

any specific plan for suicide at this point.





11/9/17


- The patient endorses what he refers to his overwhelming feelings of sadness, 

combined with high levels of anxiety, and multiple ongoing arthralgias and 

myalgias which are believed to be secondary to Lyme disease in his case.  Today

, he tells me that without substantial relief from his symptoms of depression, 

anxiety and physical complaints he will act on his thoughts of committing 

suicide although he contracts for safety in the hospital and says "I wouldn't 

do anything while I'm under treatment here."  The patient clearly continues to 

require inpatient psychiatric hospitalization





11/10/17


- The patient tells me that he is not currently having suicidal thoughts, but 

feels that were he to return to the community at this point the suicidal 

thoughts with intent would potentially recur, in the absence of substantial 

improvement in his overwhelming feelings of depression, combined with ongoing 

myalgias and arthralgias.





11/13


- Patient encouraged to work on his discharge safety plan.





(3) Lyme disease


Continue home dose of doxycycline, and follow-up with outpatient provider as 

directed by emergency room staff.  He does not have a PCP, and states his 

parents were working on getting him medical assistance.





11/8/17 


- Both the REGIS and the Western blot tests have come back positive for Lyme 

disease.  The patient is taking doxycycline 100 mg twice a day.  I let him know 

that it is possible that his depression, and to a lesser extent, his anxiety 

may be attributable to Lyme disease.  However, he has had his physical symptoms 

for at least 6 years, and his symptoms of anxiety have emerged largely in the 

past 11 weeks, circumstance which suggests that there is more going on 

clinically than simply complications of Lyme disease.





11/9/17


- Obtain an infectious disease consultation in order to receive recommendations 

for treatment, as necessary, following his planned three-week course of 

doxycycline.  Also like to rule out, if possible, central nervous system 

involvement.





11/10/17


- Dr. Payan spoke with Dr. Martinez of ID in order to obtain recommendations 

regarding the patient's Lyme Disease.  Dr. Martinez recommended continuing 

doxycycline 100 mg twice a day for an additional 1 month, with outpatient follow

-up as indicated by an absence of resolution of the patient's symptoms.





11/13


- Refer for outpatient follow-up in preparation for discharge.








Discharge / Aftercare Planning


Primary Care Physician:  


   Name:  Dr Marie


   Phone Number:  [phone]


   Date of Appointment:  Nov 21, 2017


   Time of Appointment:  1:45pm


Psychiatrist:  


   Name:  [facility] [address] [facility]


   Phone Number:  [phone]


   Date of Appointment:  Nov 27, 2017


   Time of Appointment:  100pm


   Appointment Notes:  intake with Shagufta Elmore


Therapist:  


   Name:  [facility]


   Phone Number:  [phone]


   Appointment Notes:  will refer after intake


:  


   Name:  None





Visit Code


E&M Code:  86410





Inventory Assets


Strengths:


Has housing, supportive family


Needs:


Anxiety.  Depression.  Lyme Disease with multiple physical complaints.  Social 

isolation.





Risk Factors Assessment


Male:  Yes


:  Yes


/single/:  Yes


Higher / Fall in social status:  Yes


Health problems:  Yes


Mental Health Diagnoses:  Yes


Substance use disorders:  No


Previous attempt:  No


Previous psychiatric stay:  No


Hopelessness:  Yes


Smoker:  No





Protective Factors Assessment


Confucianism beliefs:  No


:  No


Responsible for young children:  No


Employed:  No


Stable relationships:  No


Supportive family:  Yes


Good rapport with provider:  No





Data


Vital Signs Last 24 Hrs:











  Date Time  Temp Pulse Resp B/P (MAP) Pulse Ox O2 Delivery O2 Flow Rate FiO2


 


11/13/17 06:51 36.7 64 16 107/69    





  81  99/62    








Meds Administered Last 24 Hrs:





Meds Administered (Past 24Hrs)








 Medications


  (Trade)  Dose


 Ordered  Sig/Alexia


 Route  Start Time


 Stop Time Status Last Admin


Dose Admin


 


 Sertraline HCl


  (Zoloft Tab)  75 mg  QAM


 PO  11/12/17 09:00


 11/12/17 13:36 DC 11/12/17 09:03


75 MG


 


 Lorazepam


  (Ativan Tab)  1 mg  Q12H


 PO  11/12/17 21:00


 12/9/17 21:59  11/13/17 08:18


1 MG


 


 Sertraline HCl


  (Zoloft Tab)  100 mg  QAM


 PO  11/13/17 09:00


 12/10/17 08:59  11/13/17 08:18


100 MG


 


 Lorazepam


  (Ativan Tab)  0.5 mg  QD@1400


 PO  11/12/17 14:00


 12/12/17 13:59  11/12/17 14:00


0.5 MG

## 2017-11-14 NOTE — PSYCHIATRIC PROGRESS NOTES
Progress Note


Date of Service


Nov 14, 2017.





Interval History


Cain Zhou is a 32-year-old male who currently lives in [place], has no 

psychiatric history, and presented to the emergency room requesting psychiatric 

admission for stress, anxiety, and suicidal thoughts.  He was admitted on a 201 

voluntary commitment.





Chief Complaint


"I feel pretty awful, depressed".





Subjective


Patient was seen & assessed interval progress reviewed with nursing. Staff 

report he has been upset about discussions around discharge, stating that he 

doesn't feel ready to leave, and his mood actually worsened yesterday due to 

this.  He rates his mood a 1-10, and said he felt unable to contract for safety 

outside the hospital, and concerned he would commit suicide if he went home.  

Staff spent time with him and his parents discussing his discharge plans and 

recommendations for aftercare.  He has been going to groups and socializing 

with peers, and affect is brighter when he is with other people.  Today, the 

patient reports mood is worse today, he feels sad, overwhelmed, and afraid that 

he will kill himself if he goes home.  He says his anxiety is "really bad," but 

cannot describe his symptoms any further.  He continues to take Ativan 3 times 

a day as scheduled, and says it helps with anxiety.  Appetite remains poor, and 

he refused dinner last night.  He says he is better than on admission, but does 

not feel ready to leave. He continues to feel unable to cope and "I've lost the 

desire to live." He is going to groups and finds it helps to be around others, 

but says he has "no idea" how to incorporate this into his discharge plan. He 

is willing for a referral to Clubhouse or Psych Rehab, and wants to volunteer 

"someplace like this, where I can just come and hang out with the patients."





Sleep Information


Total Hours of Sleep:  6.50





Meal Information


Percent of Breakfast Consumed:  100


Percent of Lunch Consumed:  75


Percent of Dinner Consumed:  0





Mental Status Exam


During interview pt is:  alert and oriented, cooperative


Appearance:  appropriately dressed, appropriately groomed, other (large bump 

and scar on his right forehead)


Eye contact is:  poor (looks at floor for most of the interaction)


Motor behavior is:  steady gait & station, no abnormal motor movements


Speech:  normal in rate, rhythm & volume (monotone, minimal speech)


Affect:  mood congruent, depressed, irritable, anxious (improved from last week)


Mood is:  other ("really bad")


Thought process:  goal directed


Thought content:  preoccupation (with not feeling ready for discharge)


Suicidal thought are:  present (states he does not feel safe to go home, and 

thinks he would be suicidal), Plan: denied


Homicidal thoughts are:  denied


Hallucinations:  denies auditory, denies visual


Cognition:  memory grossly intact, attention grossly intact, language grossly 

intact


Intelligence estimated to be:  consistent with level of education, above average


Insight:  fair


Judgement:  fair





Impression


Continues to slowly improve with respect to mood and anxiety, but very 

resistant to engaging in discharge planning, feeling that he needs a long 

hospitalization and does not feel safe to go home.  Now on 100 mg of sertraline 

and 2.5mg or lorazepam in divided dosing.  Contrary to the patient's assertion 

at the time of admission, the patient is now saying that he realizes that his 

depression and anxiety are a greater problem than his physical symptoms, and he 

is eager to continue treatment for both depression/anxiety and Lyme disease.  

He will need follow up with psychiatry and a general practitioner regarding his 

Lyme treatment.  He would benefit from a plan to increase his socialization at 

home, and is agreeing [facility] and 

psych rehabilitation.





Plan





(1) Panic disorder


Most likely diagnosis is panic disorder, although at this point cannot rule out 

anxiety due to general medical condition (Lyme disease). 


Also rule out major depressive disorder.


Get collateral information from family (regarding his adult life, previous 

level of functioning prior to the past 10 weeks, they're understanding of why 

he has not been able to work for the past 6 years, if there was any worsening 

of symptoms after his head injury 13 years ago, and clarification of recent 

stressors, specifically why he was asked to stop volunteering).  Family meeting 

with parents.  They will need to be involved in discharge safety planning as 

well.


We briefly discussed medication options for panic, specifically an SSRI 

antidepressant.  The patient would like to try nonpharmacologic methods of 

handling his anxiety first.  Refer for outpatient therapy and psychiatric follow

-up.


Hydroxyzine as needed for sleep and anxiety.


Encourage participation in unit groups and programming.  Work on healthy coping 

skills.





11/7


-Patient continues to report high anxiety and poor sleep. He continues to 

refuse a trial of an SSRI, although he has been educated repeatedly about the 

risks, benefits and side effects. Provided him with an Up To Date patient 

handout about SSRIs and encouraged him to continue to educate himself and ask 

questions. If he agrees, will start escitalopram 10mg.


-Reviewed behavioral techniques he can utilize to manage anxiety. Avoiding 

benzos if possible, as patient states he does not want medication that will be 

sedating, but he has worsened throughout the day today and has approached staff 

multiple times asking for something prn for anxiety, doesn't feel hydroxyzine 

worked, so will try lorazepam 0.5mg q 6 hrs prn. 


-Patient is requesting a different medication for sleep.  We reviewed the risks

, benefits, and side effects of trazodone, including priapism, and he agreed to 

a trial.  I will order 50 mg daily at bedtime tonight, which can be repeated 

once if needed.





11/8/17


-The patient reports that he has responded favorably to Ativan, but remains 

anxious.  He has had no further panic episodes, however, we spent a great deal 

of time discussing a trial of SSRI, and today the patient tells me that he is 

inclined to try sertraline, prefers wishes to discuss this decision with his 

parents.  I was able to speak with the patient's father about this 

recommendation, and the patient's father was in agreement with the plan to use 

an SSRI, but wished to do some "online research" first.


- The patient tells me that he slept well with trazodone, and finds this to be 

a helpful medication.





11/9/17


-- The patient reports that he has had no further panic attacks.  He also 

reports that he received substantial relief, but not complete relief, from 

lorazepam 0.5 mg every 6 hours.  He says that he is asking for it routinely 3 

times a day, but does not require it during the night.  The plan is to address 

the patient's depression and anxiety in a 2 pronged approach: Address the 

possible infectious disease component secondary to Lyme disease, and treat both 

the depression and anxiety with an SSRI (sertraline), together with lorazepam 

while we are titrating the dose of sertraline.





11/10/17


-- The patient reports significantly reduced anxiety, and attributes this 

improvement to a combination of sertraline and lorazepam.  He has had no 

further panic episodes, and finds that his levels of anxiety or now manageable.

  He understands that the goal is to eventually taper and discontinue lorazepam 

wants he is at a standard dose of sertraline.  We are proceeding carefully with 

sertraline because of the patient's various physical complaints, and we want to 

avoid side effects that may discourage the patient from adherence.





11/11/17 


-- raised zoloft to 75mg for 11/12 dose,  maintained ativan 1mg tid for now 

with plan to wean down dose as further improvement occurs, maintained trazodone 

at 50mg for insomnia at bedtime 





11/12/17  


-- raised zoloft to 100mg for 11/13 dose. lower ativan to 1mg 9am and  0.5mg 

2pm and 1mg 9pm for today, with room to lower ativan further tomorrow if 

anxiety not worsened with this med adjustment. pt could envision holding 2pm 

dose on 11/13 if tolerating lowering of 2pm dose today well.  





11/13


-- Continue current medications, and refer for outpatient follow-up with a 

psychiatrist and therapist (referred to [facility]).  Also explore options for psych 

rehabilitation or clubhouse, as he would benefit from increased socialization 

and feels the programming here has been helpful.





11/14


-- Patient encouraged to engage more with discharge planning, and to work on 

ways to increase his socialization at home and ensure a robust safety plan.  He 

has been resistant to this, stating he wants to stay in the hospital until he 

is completely recovered, and we discussed that hospitalization will be acute 

only, and that much of his recovery will occur at home in the outpatient 

setting.


-- [facility] and [facility]house.  Has 

an intake at [facility] 11/27/2017.





(2) Depression


As patient's anxiety has improved, he has become more aware of his severe 

depressive symptoms, with ongoing suicidal ideation.  Sertraline started and 

titrated upwards as above.





(3) Suicidal ideation


Every 15 minute checks for safety.


Work on healthy coping skills and discharge safety plan.





11/8/17


- Today, the patient says that he is not having active suicidal thoughts, but 

continues to say that he considers suicide to be an option in the event that 

his feelings of anxiety, depression, and his various physical complaints don't 

improve over time.  I was able to help him understand that this is "a bad time 

that will pass," and that "things will get better."  The patient said that this 

was reassuring and that he hopes one day to be able to talk to other people who 

are going through similar experiences in order to help them.  He agrees to 

notify staff should he develop any active suicidal thoughts.  He does not have 

any specific plan for suicide at this point.





11/9/17


- The patient endorses what he refers to his overwhelming feelings of sadness, 

combined with high levels of anxiety, and multiple ongoing arthralgias and 

myalgias which are believed to be secondary to Lyme disease in his case.  Today

, he tells me that without substantial relief from his symptoms of depression, 

anxiety and physical complaints he will act on his thoughts of committing 

suicide although he contracts for safety in the hospital and says "I wouldn't 

do anything while I'm under treatment here."  The patient clearly continues to 

require inpatient psychiatric hospitalization





11/10/17


- The patient tells me that he is not currently having suicidal thoughts, but 

feels that were he to return to the community at this point the suicidal 

thoughts with intent would potentially recur, in the absence of substantial 

improvement in his overwhelming feelings of depression, combined with ongoing 

myalgias and arthralgias.





11/13


- Patient encouraged to work on his discharge safety plan.





(4) Lyme disease


Continue home dose of doxycycline, and follow-up with outpatient provider as 

directed by emergency room staff.  He does not have a PCP, and states his 

parents were working on getting him medical assistance.





11/8/17 


- Both the REGIS and the Western blot tests have come back positive for Lyme 

disease.  The patient is taking doxycycline 100 mg twice a day.  I let him know 

that it is possible that his depression, and to a lesser extent, his anxiety 

may be attributable to Lyme disease.  However, he has had his physical symptoms 

for at least 6 years, and his symptoms of anxiety have emerged largely in the 

past 11 weeks, circumstance which suggests that there is more going on 

clinically than simply complications of Lyme disease.





11/9/17


- Obtain an infectious disease consultation in order to receive recommendations 

for treatment, as necessary, following his planned three-week course of 

doxycycline.  Also like to rule out, if possible, central nervous system 

involvement.





11/10/17


- Dr. Payan spoke with Dr. Martinez of ID in order to obtain recommendations 

regarding the patient's Lyme Disease.  Dr. Martinez recommended continuing 

doxycycline 100 mg twice a day for an additional 1 month, with outpatient follow

-up as indicated by an absence of resolution of the patient's symptoms.





11/13


- Refer for outpatient follow-up in preparation for discharge.





11/14


- Referred to Dr. Spears 11/21/7. 








Discharge / Aftercare Planning


Primary Care Physician:  


   Name:  Dr Marie


   Phone Number:  [phone]


   Date of Appointment:  Nov 21, 2017


   Time of Appointment:  1:45pm


Psychiatrist:  


   Name:  [facility] [address] [facility]


   Phone Number:  [phone]


   Date of Appointment:  Nov 27, 2017


   Time of Appointment:  100pm


   Appointment Notes:  intake with Shagufta Elmore


Therapist:  


   Name:  [facility]


   Phone Number:  [phone]


   Appointment Notes:  will refer after intake


:  


   Name:  None





Visit Code


E&M Code:  34731





Inventory Assets


Strengths:


Has housing, supportive family


Needs:


Anxiety.  Depression.  Lyme Disease with multiple physical complaints.  Social 

isolation.





Risk Factors Assessment


Male:  Yes


:  Yes


/single/:  Yes


Higher / Fall in social status:  Yes


Health problems:  Yes


Mental Health Diagnoses:  Yes


Substance use disorders:  No


Previous attempt:  No


Previous psychiatric stay:  No


Hopelessness:  Yes


Smoker:  No





Protective Factors Assessment


Denominational beliefs:  No


:  No


Responsible for young children:  No


Employed:  No


Stable relationships:  No


Supportive family:  Yes


Good rapport with provider:  No





Data


Vital Signs Last 24 Hrs:











  Date Time  Temp Pulse Resp B/P (MAP) Pulse Ox O2 Delivery O2 Flow Rate FiO2


 


11/14/17 06:52 36.6 80 16 118/75    





  97  112/70    








Meds Administered Last 24 Hrs:





Meds Administered (Past 24Hrs)








 Medications


  (Trade)  Dose


 Ordered  Sig/Alxeia


 Route  Start Time


 Stop Time Status Last Admin


Dose Admin


 


 Sertraline HCl


  (Zoloft Tab)  75 mg  QAM


 PO  11/12/17 09:00


 11/12/17 13:36 DC 11/12/17 09:03


75 MG


 


 Lorazepam


  (Ativan Tab)  1 mg  Q12H


 PO  11/12/17 21:00


 12/9/17 21:59  11/14/17 08:40


1 MG


 


 Sertraline HCl


  (Zoloft Tab)  100 mg  QAM


 PO  11/13/17 09:00


 12/10/17 08:59  11/14/17 08:40


100 MG


 


 Lorazepam


  (Ativan Tab)  0.5 mg  QD@1400


 PO  11/12/17 14:00


 12/12/17 13:59  11/13/17 13:55


0.5 MG











Problem Qualifiers





(1) Depression:  


Depression Type:  major depressive disorder  Major depression recurrence:  

single episode  Active/Remission status:  currently active  Major depression 

episode severity:  severe  Psychotic features:  without psychotic features  

Qualified Codes:  F32.2 - Major depressive disorder, single episode, severe 

without psychotic features

## 2017-11-15 NOTE — PSYCHIATRIC PROGRESS NOTES
Progress Note


Date of Service


Nov 15, 2017.





Interval History


Cain Zhou is a 32-year-old male who currently lives in [place], has no 

psychiatric history, and presented to the emergency room requesting psychiatric 

admission for stress, anxiety, and suicidal thoughts.  He was admitted on a 201 

voluntary commitment.





Chief Complaint


"Okay".





Subjective


Patient was seen & assessed interval progress reviewed with Treatment Team. 

Staff report he has been reporting low mood, rating it a 1/10, although this 

was not congruent with his affect, which was bright and cheerful. He complained 

that he is "being kicked out" of the hospital after he was informed of his 

estimated discharge date. He was referred to [facility] and had a 

lot of questions about the treatment there. He is attending groups and during 

one group, kept his eyes closed and responded only if the counselor directly 

questioned him. Today, he says mood is "ok," and he wants to talk to someone to 

get "more details" about his aftercare. He was encouraged to call [facility] 

directly if he has more specific questions. He continues to endorse anxiety, 

describes it as "up and down," as he gets more anxious whenever he thinks or 

talks about discharge. He is still taking Ativan 3 times a day and says he's 

"not really quite ready to decrease it, I can't do anything until I take my 

morning Ativan." Discussed decreasing bedtime dose to 0.5 and he agreed. Also 

discussed increasing his sertraline to target mood and anxiety. He reports 

improved sleep and thinks trazodone is helping. Appetite is better, but still 

not eating as much as he should, limited by anxiety. He denies SI here and says 

"as long as I have a plan when I get outta here, [facility], I think I'll be okay

, but if everything gets shut down, I'm not okay." He is still thinking about 

volunteering, but "I don't feel like I'm quite ready, I'm still in the patient 

mode, need to recuperate more." He continues to say he wants to work with 

"patients, just hanging out."





Sleep Information


Total Hours of Sleep:  6.50





Meal Information


Percent of Breakfast Consumed:  20


Percent of Lunch Consumed:  100


Percent of Dinner Consumed:  50





Mental Status Exam


During interview pt is:  alert and oriented, cooperative


Appearance:  appropriately dressed, appropriately groomed, other (large bump 

and scar on his right forehead)


Eye contact is:  poor (looks at floor for most of the interaction)


Motor behavior is:  steady gait & station, no abnormal motor movements


Speech:  normal in rate, rhythm & volume (monotone, minimal speech)


Affect:  mood congruent, depressed, irritable, anxious (improved from last week)


Mood is:  other ("really bad")


Thought process:  goal directed


Thought content:  preoccupation (with not feeling ready for discharge)


Suicidal thought are:  denied


Homicidal thoughts are:  denied


Hallucinations:  denies auditory, denies visual


Cognition:  memory grossly intact, attention grossly intact, language grossly 

intact


Intelligence estimated to be:  consistent with level of education, above average


Insight:  fair


Judgement:  fair





Impression


Continues to slowly improve with respect to mood and anxiety, but very 

resistant to engaging in discharge planning, feeling that he needs a long 

hospitalization and does not feel safe to go home.  Increasing sertraline to 

150mg and decreasing lorazepam.  Contrary to the patient's assertion at the 

time of admission, the patient is now saying that he realizes that his 

depression and anxiety are a greater problem than his physical symptoms, and he 

is eager to continue treatment for both depression/anxiety and Lyme disease.  

He will need follow up with psychiatry and a general practitioner regarding his 

Lyme treatment.  He would benefit from a plan to increase his socialization at 

home, and is agreeing [facility] and 

psych rehabilitation.





Plan





(1) Panic disorder


Most likely diagnosis is panic disorder, although at this point cannot rule out 

anxiety due to general medical condition (Lyme disease). 


Also rule out major depressive disorder.


Get collateral information from family (regarding his adult life, previous 

level of functioning prior to the past 10 weeks, they're understanding of why 

he has not been able to work for the past 6 years, if there was any worsening 

of symptoms after his head injury 13 years ago, and clarification of recent 

stressors, specifically why he was asked to stop volunteering).  Family meeting 

with parents.  They will need to be involved in discharge safety planning as 

well.


We briefly discussed medication options for panic, specifically an SSRI 

antidepressant.  The patient would like to try nonpharmacologic methods of 

handling his anxiety first.  Refer for outpatient therapy and psychiatric follow

-up.


Hydroxyzine as needed for sleep and anxiety.


Encourage participation in unit groups and programming.  Work on healthy coping 

skills.





11/7


-Patient continues to report high anxiety and poor sleep. He continues to 

refuse a trial of an SSRI, although he has been educated repeatedly about the 

risks, benefits and side effects. Provided him with an Up To Date patient 

handout about SSRIs and encouraged him to continue to educate himself and ask 

questions. If he agrees, will start escitalopram 10mg.


-Reviewed behavioral techniques he can utilize to manage anxiety. Avoiding 

benzos if possible, as patient states he does not want medication that will be 

sedating, but he has worsened throughout the day today and has approached staff 

multiple times asking for something prn for anxiety, doesn't feel hydroxyzine 

worked, so will try lorazepam 0.5mg q 6 hrs prn. 


-Patient is requesting a different medication for sleep.  We reviewed the risks

, benefits, and side effects of trazodone, including priapism, and he agreed to 

a trial.  I will order 50 mg daily at bedtime tonight, which can be repeated 

once if needed.





11/8/17


-The patient reports that he has responded favorably to Ativan, but remains 

anxious.  He has had no further panic episodes, however, we spent a great deal 

of time discussing a trial of SSRI, and today the patient tells me that he is 

inclined to try sertraline, prefers wishes to discuss this decision with his 

parents.  I was able to speak with the patient's father about this 

recommendation, and the patient's father was in agreement with the plan to use 

an SSRI, but wished to do some "online research" first.


- The patient tells me that he slept well with trazodone, and finds this to be 

a helpful medication.





11/9/17


-- The patient reports that he has had no further panic attacks.  He also 

reports that he received substantial relief, but not complete relief, from 

lorazepam 0.5 mg every 6 hours.  He says that he is asking for it routinely 3 

times a day, but does not require it during the night.  The plan is to address 

the patient's depression and anxiety in a 2 pronged approach: Address the 

possible infectious disease component secondary to Lyme disease, and treat both 

the depression and anxiety with an SSRI (sertraline), together with lorazepam 

while we are titrating the dose of sertraline.





11/10/17


-- The patient reports significantly reduced anxiety, and attributes this 

improvement to a combination of sertraline and lorazepam.  He has had no 

further panic episodes, and finds that his levels of anxiety or now manageable.

  He understands that the goal is to eventually taper and discontinue lorazepam 

wants he is at a standard dose of sertraline.  We are proceeding carefully with 

sertraline because of the patient's various physical complaints, and we want to 

avoid side effects that may discourage the patient from adherence.





11/11/17 


-- raised zoloft to 75mg for 11/12 dose,  maintained ativan 1mg tid for now 

with plan to wean down dose as further improvement occurs, maintained trazodone 

at 50mg for insomnia at bedtime 





11/12/17  


-- raised zoloft to 100mg for 11/13 dose. lower ativan to 1mg 9am and  0.5mg 

2pm and 1mg 9pm for today, with room to lower ativan further tomorrow if 

anxiety not worsened with this med adjustment. pt could envision holding 2pm 

dose on 11/13 if tolerating lowering of 2pm dose today well.  





11/13


-- Continue current medications, and refer for outpatient follow-up with a 

psychiatrist and therapist (referred to [facility]).  Also explore options for psych 

rehabilitation or clubhouse, as he would benefit from increased socialization 

and feels the programming here has been helpful.





11/14


-- Patient encouraged to engage more with discharge planning, and to work on 

ways to increase his socialization at home and ensure a robust safety plan.  He 

has been resistant to this, stating he wants to stay in the hospital until he 

is completely recovered, and we discussed that hospitalization will be acute 

only, and that much of his recovery will occur at home in the outpatient 

setting.


-- [facility] for psych [facility] and clubhouse.  Has 

an intake at [facility] 11/27/2017.





11/15


-- Panic improved, although remains generally anxious.  Decrease lorazepam to 1 

mg every morning and 0.5 mg in the afternoon at bedtime, and increase 

sertraline to 150 mg daily.  Patient was again educated about the risks of 

benzodiazepine use, and the recommendations to use this medication for short-

term treatment and to taper off of it over the next few weeks.





(2) Depression


As patient's anxiety has improved, he has become more aware of his severe 

depressive symptoms, with ongoing suicidal ideation.  Sertraline started and 

titrated upwards as above.





(3) Suicidal ideation


Every 15 minute checks for safety.


Work on healthy coping skills and discharge safety plan.





11/8/17


- Today, the patient says that he is not having active suicidal thoughts, but 

continues to say that he considers suicide to be an option in the event that 

his feelings of anxiety, depression, and his various physical complaints don't 

improve over time.  I was able to help him understand that this is "a bad time 

that will pass," and that "things will get better."  The patient said that this 

was reassuring and that he hopes one day to be able to talk to other people who 

are going through similar experiences in order to help them.  He agrees to 

notify staff should he develop any active suicidal thoughts.  He does not have 

any specific plan for suicide at this point.





11/9/17


- The patient endorses what he refers to his overwhelming feelings of sadness, 

combined with high levels of anxiety, and multiple ongoing arthralgias and 

myalgias which are believed to be secondary to Lyme disease in his case.  Today

, he tells me that without substantial relief from his symptoms of depression, 

anxiety and physical complaints he will act on his thoughts of committing 

suicide although he contracts for safety in the hospital and says "I wouldn't 

do anything while I'm under treatment here."  The patient clearly continues to 

require inpatient psychiatric hospitalization





11/10/17


- The patient tells me that he is not currently having suicidal thoughts, but 

feels that were he to return to the community at this point the suicidal 

thoughts with intent would potentially recur, in the absence of substantial 

improvement in his overwhelming feelings of depression, combined with ongoing 

myalgias and arthralgias.





11/13


- Patient encouraged to work on his discharge safety plan.





(4) Lyme disease


Continue home dose of doxycycline, and follow-up with outpatient provider as 

directed by emergency room staff.  He does not have a PCP, and states his 

parents were working on getting him medical assistance.





11/8/17 


- Both the REGIS and the Western blot tests have come back positive for Lyme 

disease.  The patient is taking doxycycline 100 mg twice a day.  I let him know 

that it is possible that his depression, and to a lesser extent, his anxiety 

may be attributable to Lyme disease.  However, he has had his physical symptoms 

for at least 6 years, and his symptoms of anxiety have emerged largely in the 

past 11 weeks, circumstance which suggests that there is more going on 

clinically than simply complications of Lyme disease.





11/9/17


- Obtain an infectious disease consultation in order to receive recommendations 

for treatment, as necessary, following his planned three-week course of 

doxycycline.  Also like to rule out, if possible, central nervous system 

involvement.





11/10/17


- Dr. Payan spoke with Dr. Martinez of ID in order to obtain recommendations 

regarding the patient's Lyme Disease.  Dr. Martinez recommended continuing 

doxycycline 100 mg twice a day for an additional 1 month, with outpatient follow

-up as indicated by an absence of resolution of the patient's symptoms.





11/13


- Refer for outpatient follow-up in preparation for discharge.





11/14


- Referred to Dr. Spears 11/21/7. 








Discharge / Aftercare Planning


Primary Care Physician:  


   Name:  Dr Marie


   Phone Number:  [phone]


   Date of Appointment:  Nov 21, 2017


   Time of Appointment:  1:45pm


Psychiatrist:  


   Name:  [facility]


   Phone Number:  [phone]


   Date of Appointment:  Nov 27, 2017


   Time of Appointment:  1:00pm


   Appointment Notes:  intake with Shagufta Elmore


Therapist:  


   Name:  [facility]


   Phone Number:  [phone]


   Appointment Notes:  will refer after intake


:  


   Name:  None


Partial or Psych Rehab:  


   Name:  [facility]


   Phone Number:  [phone]


   Appointment Notes:  They will call you at home to schedule tour.





Visit Code


E&M Code:  67392





Inventory Assets


Strengths:


Has housing, supportive family


Needs:


Anxiety.  Depression.  Lyme Disease with multiple physical complaints.  Social 

isolation.





Risk Factors Assessment


Male:  Yes


:  Yes


/single/:  Yes


Higher / Fall in social status:  Yes


Health problems:  Yes


Mental Health Diagnoses:  Yes


Substance use disorders:  No


Previous attempt:  No


Previous psychiatric stay:  No


Hopelessness:  Yes


Smoker:  No





Protective Factors Assessment


Cheondoism beliefs:  No


:  No


Responsible for young children:  No


Employed:  No


Stable relationships:  No


Supportive family:  Yes


Good rapport with provider:  No





Data


Vital Signs Last 24 Hrs:











  Date Time  Temp Pulse Resp B/P (MAP) Pulse Ox O2 Delivery O2 Flow Rate FiO2


 


11/15/17 07:07 36.5 64 16 122/73    





  76  105/67    











Problem Qualifiers





(1) Depression:  


Depression Type:  major depressive disorder  Major depression recurrence:  

single episode  Active/Remission status:  currently active  Major depression 

episode severity:  severe  Psychotic features:  without psychotic features  

Qualified Codes:  F32.2 - Major depressive disorder, single episode, severe 

without psychotic features

## 2017-11-16 NOTE — DISCHARGE INSTRUCTIONS
Discharge Information


Report Includes


Report will include the:  Discharge Instructions & Summary





Admission


Admission Date / Time:  2017 at 13:18


Reason for Admission:  Suicidal Ideation





Discharge


Discharge Diagnosis / Problem:  Panic disorder, major depressive disorder


Condition at Discharge:  Fair





Discharge Goals


Goal(s):  Improve function, Improve disease control, Learn about illness, 

Therapeutic intervention, Specific goals (refer for outpatient mental health 

care)





Activity Recommendations


Activity Limitations:  per Instructions/Follow-up section





.





Instructions / Follow-Up


Instructions / Follow-Up


.





SPECIAL CARE INSTRUCTIONS:





1.  Follow through with your scheduled aftercare appointments.  If unable to 

keep an appointment, please call to reschedule.





2.  Take your medication only as prescribed.  Medication should not be changed 

or stopped without the approval of your doctor.  In the event of worsening 

symptoms or concerns about side effects, contact your doctor immediately.





  Instructions for tapering off lorazepam: 


Week 1: Take 1mg (two 0.5mg tablets) in the morning, 0.5mg mid-day, and 0.5mg 

at bedtime.


Week 2: Take 1mg in the morning and 0.5mg at bedtime.


Week 3: Take 1mg in the morning.


Week 4: Take 0.5mg in the morning.


Week 5: Stop.





3.  Utilize new healthy coping skills, anger management skills, and stress 

management skills learned during your hospitalization.  Journal feelings and 

process them with a support person.  Identify stressors or situations that may 

result in relapse, deterioration or inappropriate behaviors and develop a plan 

to deal with those issues.





4.  If your coping skills are ineffective and you are in crisis, contact your 

outpatient providers for direction.  If unable to reach your providers, please 

call the  CAN HELP LINE AT [phone] or go to the closest Emergency Room.





5.  Avoid alcohol and un-prescribed drugs.





6.  You have been provided with the Mental Health Advance Directives Pamphlet 

for your review.








AFTERCARE APPOINTMENTS: 





*  Please call your insurance company prior to your scheduled appointment to 

confirm your aftercare providers are covered.  Take your insurance information 

to your appointments.





.





Discharge / Aftercare Planning


Primary Care Physician:  


   Name:  Dr Marie


   Phone Number:  [phone]


   Date of Appointment:  2017


   Time of Appointment:  1:45pm


Psychiatrist:  


   Name:  [facility] [address] [facility]


   Phone Number:  [phone]


   Date of Appointment:  2017


   Time of Appointment:  1:00pm


   Appointment Notes:  intake with Shagufta Elmore


Therapist:  


   Name Of Therapist:  [facility]


   Phone Number:  [phone]


   Appointment Comments:  will refer after intake


:  


   Name:  None


Partial or Psych Rehab:  


   Name:  [facility]


   Phone Number:  [phone]


   Appointment Comments:  They will call you at home to schedule tour.





.





Follow-Up Care


Plan for Follow-Up Care:


See above.





Current Hospital Diet


Patient's current hospital diet: Low Lactose Diet





Discharge Diet


Recommended Diet:  Regular Diet





Procedures


Procedures Performed:  No





Pending Studies


Pending Studies at Discharge:  No





Medical Emergencies








.


Who to Call and When:





Medical Emergencies:  


For questions or emergencies related to your hospital stay, please contact the 

Inpatient Behavioral Health Unit at [phone].





A psychiatric clinician is on-call  for the Behavioral Health Unit for 

emergencies





At any time you feel your situation is an emergency, you may also call 911 

immediately.





.





Non-Emergent Contact


Non-Emergency issues call your:  Primary Care Provider, Psychiatrist, Therapist





Past History


Medical & Surgical History:  


(1) Lyme disease





Advance Directives


Existing Advance Directive:  No


Do You Have an Existing Mental:  No


Existing Living Will:  No


Existing Power of :  No


Advance Directives Info Given:  To Pt/S.O.


Advance Directives Reason:


Declines as Mental Health Visit.





Discharge Summary


Admission HPI


Per the Admitting provider:


On review of records, this is the patient's first admission to our facility, 

but he has been seen in the emergency room 3 times in the past 6 days (prior 

visits for chest pain and shortness of breath).  He reported that he was 

"triggered" 10 weeks ago, and since that time has been increasingly anxious, 

with chest pain and shortness of breath, decreased appetite, nausea, numbness 

in his left arm, and vision problems.  He had a noncontrast head CT at the time 

of his first emergency room visit 10/31/2017, which was normal.  His Lyme 

antibody was positive, and he was started on doxycycline.  He returned to the 

ER 2 days later reporting shortness of breath and intermittent epigastric pain 

for the past 10 weeks.  He was hypertensive and tachycardic, and had a CT 

angiogram of the chest which showed no acute findings or evidence of PE.  His 

shortness of breath was felt to be due to asthma or GERD, and he was given 

albuterol, Zantac, and Protonix.  He then re-presented to the emergency room 

today reporting anxiety for the past several weeks, feeling overwhelmed, 

defeated, and said he had lost the will to live.  He reported 4 episodes of 

severe anxiety over the past several weeks, which result in "a heavy stress and 

strain on my heart."  He reported multiple other physical symptoms that 

accompanied these episodes, including shortness of breath, hyperventilation, 

left arm numbness, and felt exhausted afterwards.  The first 2 episodes lasted 5

-10 minutes, and the second 2 episodes occurred this past weekend and were 

longer.  He told emergency room staff stating that he did not feel safe going 

home, and would hang himself.  He signed in voluntarily, but was very anxious 

throughout the process, and had many questions about admission. The liaison 

nurse reviewed the Common Language documents with him.





He was seen with Kelvin Jacques, MS3. He reports worsening "physical issues" 

for the past 10 weeks, including stomach pain, chest pain, SOB, and this led to 

inability to eat, with a 20-30 lb estimated weight loss over the past 10 weeks. 

He reports episodes of feeling "completely overwhelmed in my chest area, this 

major stress," on Friday, Sat. and Sun., lasting an hour the past two times. He 

describes "heavy pressure on my chest area, it just shuts me down, I can't move 

until it's over." He thinks "something's triggering this in my body, a hormone 

or a stimulant, because after it goes back to normal." He feels he has a 

"physical" problem and not an "emotional" one. He says the "things that are 

happening are taking away my will to live." He says he told his parents "I can'

t take it anymore, I'm going to kill myself." He admits to a "buildup" of 

feeling overwhelmed, and feels "there's nothing left for me in this life." He 

has been having suicidal thoughts for the past few days, "since I went to the 

ER and everything came back negative." He has thought about hanging himself, 

and feels "no one can relate to me." He endorses hopelessness, and says he is 

"not so sure I can recover." He denies panic attacks prior to the past few 

weeks. He admits to worsening anxiety over 10 weeks, saying he was "triggered" 

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 

was laid off through my mother, there wasn't much of an explanation." He says 

the person he was volunteering for "thought I had issues and fired me, but I 

thought maybe that person had issues." He is vague and doesn't want to provide 

further detail. He says he has had "health issues, pain" for years so has not 

worked in over 6 years. Despite his self described severe symptoms, he has 

never seen a doctor prior to his ER visits, and can't explain why. He does not 

think he is depressed, describes mood as "kind of subject to my physical 

condition." He admits to worsening mood over the past couple months, decreased 

interest, is isolating at home in his bedroom, sleep is disrupted as he is 

spending most of time in bed, denies guilt, and denies worrying, and denies 

that he is worried about having another panic attack. He denies a history of 

tommy, psychosis, OCD, PTSD. He says he "has a strong preference for sober mood

, stable emotions."





Admission Exam


Per the Admitting provider:


Please see admission H&P.





Consultations


None.





Hospital Course





(1) Panic disorder


Most likely diagnosis is panic disorder, although at this point cannot rule out 

anxiety due to general medical condition (Lyme disease). 


Also rule out major depressive disorder.


Get collateral information from family (regarding his adult life, previous 

level of functioning prior to the past 10 weeks, they're understanding of why 

he has not been able to work for the past 6 years, if there was any worsening 

of symptoms after his head injury 13 years ago, and clarification of recent 

stressors, specifically why he was asked to stop volunteering).  Family meeting 

with parents.  They will need to be involved in discharge safety planning as 

well.


We briefly discussed medication options for panic, specifically an SSRI 

antidepressant.  The patient would like to try nonpharmacologic methods of 

handling his anxiety first.  Refer for outpatient therapy and psychiatric follow

-up.


Hydroxyzine as needed for sleep and anxiety.


Encourage participation in unit groups and programming.  Work on healthy coping 

skills.








-Patient continues to report high anxiety and poor sleep. He continues to 

refuse a trial of an SSRI, although he has been educated repeatedly about the 

risks, benefits and side effects. Provided him with an Up To Date patient 

handout about SSRIs and encouraged him to continue to educate himself and ask 

questions. If he agrees, will start escitalopram 10mg.


-Reviewed behavioral techniques he can utilize to manage anxiety. Avoiding 

benzos if possible, as patient states he does not want medication that will be 

sedating, but he has worsened throughout the day today and has approached staff 

multiple times asking for something prn for anxiety, doesn't feel hydroxyzine 

worked, so will try lorazepam 0.5mg q 6 hrs prn. 


-Patient is requesting a different medication for sleep.  We reviewed the risks

, benefits, and side effects of trazodone, including priapism, and he agreed to 

a trial.  I will order 50 mg daily at bedtime tonight, which can be repeated 

once if needed.





17


-The patient reports that he has responded favorably to Ativan, but remains 

anxious.  He has had no further panic episodes, however, we spent a great deal 

of time discussing a trial of SSRI, and today the patient tells me that he is 

inclined to try sertraline, prefers wishes to discuss this decision with his 

parents.  I was able to speak with the patient's father about this 

recommendation, and the patient's father was in agreement with the plan to use 

an SSRI, but wished to do some "online research" first.


- The patient tells me that he slept well with trazodone, and finds this to be 

a helpful medication.





17


-- The patient reports that he has had no further panic attacks.  He also 

reports that he received substantial relief, but not complete relief, from 

lorazepam 0.5 mg every 6 hours.  He says that he is asking for it routinely 3 

times a day, but does not require it during the night.  The plan is to address 

the patient's depression and anxiety in a 2 pronged approach: Address the 

possible infectious disease component secondary to Lyme disease, and treat both 

the depression and anxiety with an SSRI (sertraline), together with lorazepam 

while we are titrating the dose of sertraline.





11/10/17


-- The patient reports significantly reduced anxiety, and attributes this 

improvement to a combination of sertraline and lorazepam.  He has had no 

further panic episodes, and finds that his levels of anxiety or now manageable.

  He understands that the goal is to eventually taper and discontinue lorazepam 

wants he is at a standard dose of sertraline.  We are proceeding carefully with 

sertraline because of the patient's various physical complaints, and we want to 

avoid side effects that may discourage the patient from adherence.





17 


-- raised zoloft to 75mg for  dose,  maintained ativan 1mg tid for now 

with plan to wean down dose as further improvement occurs, maintained trazodone 

at 50mg for insomnia at bedtime 





17  


-- raised zoloft to 100mg for  dose. lower ativan to 1mg 9am and  0.5mg 

2pm and 1mg 9pm for today, with room to lower ativan further tomorrow if 

anxiety not worsened with this med adjustment. pt could envision holding 2pm 

dose on  if tolerating lowering of 2pm dose today well.  








-- Continue current medications, and refer for outpatient follow-up with a 

psychiatrist and therapist (referred to [facility]).  Also explore options for psych 

rehabilitation or clubhouse, as he would benefit from increased socialization 

and feels the programming here has been helpful.








-- Patient encouraged to engage more with discharge planning, and to work on 

ways to increase his socialization at home and ensure a robust safety plan.  He 

has been resistant to this, stating he wants to stay in the hospital until he 

is completely recovered, and we discussed that hospitalization will be acute 

only, and that much of his recovery will occur at home in the outpatient 

setting.


-- Refer to the base service unit for psych rehabilitation and clubhouse.  Has 

an intake at [facility] 2017.





11/15


-- Panic improved, although remains generally anxious.  Decrease lorazepam to 1 

mg every morning and 0.5 mg in the afternoon at bedtime, and increase 

sertraline to 150 mg daily.  Patient was again educated about the risks of 

benzodiazepine use, and the recommendations to use this medication for short-

term treatment and to taper off of it over the next few weeks.








-- Reviewed instructions for tapering off lorazepam by decreasing by 0.5 mg a 

week over the next 4 weeks, and reviewed potential side effects of medication, 

as well as the risk of tolerance/addiction, risk of drug drug interactions, and 

the importance of abstaining from alcohol or other sedating agents.


-- Continue sertraline 150 mg daily, and follow-up with psychiatrist and 

therapist at [facility].  Referred to the [facility] service unit and [facility] 

for ongoing treatment.





(2) Depression


As patient's anxiety has improved, he has become more aware of his severe 

depressive symptoms, with ongoing suicidal ideation.  Sertraline started and 

titrated upwards as above.








(3) Suicidal ideation


Every 15 minute checks for safety.


Work on healthy coping skills and discharge safety plan.





17


- Today, the patient says that he is not having active suicidal thoughts, but 

continues to say that he considers suicide to be an option in the event that 

his feelings of anxiety, depression, and his various physical complaints don't 

improve over time.  I was able to help him understand that this is "a bad time 

that will pass," and that "things will get better."  The patient said that this 

was reassuring and that he hopes one day to be able to talk to other people who 

are going through similar experiences in order to help them.  He agrees to 

notify staff should he develop any active suicidal thoughts.  He does not have 

any specific plan for suicide at this point.





17


- The patient endorses what he refers to his overwhelming feelings of sadness, 

combined with high levels of anxiety, and multiple ongoing arthralgias and 

myalgias which are believed to be secondary to Lyme disease in his case.  Today

, he tells me that without substantial relief from his symptoms of depression, 

anxiety and physical complaints he will act on his thoughts of committing 

suicide although he contracts for safety in the hospital and says "I wouldn't 

do anything while I'm under treatment here."  The patient clearly continues to 

require inpatient psychiatric hospitalization





11/10/17


- The patient tells me that he is not currently having suicidal thoughts, but 

feels that were he to return to the community at this point the suicidal 

thoughts with intent would potentially recur, in the absence of substantial 

improvement in his overwhelming feelings of depression, combined with ongoing 

myalgias and arthralgias.








- Patient encouraged to work on his discharge safety plan.





11/16


- Patient consistently denying suicidal thoughts, and was able to review his 

safety plan in detail.





(4) Lyme disease


Continue home dose of doxycycline, and follow-up with outpatient provider as 

directed by emergency room staff.  He does not have a PCP, and states his 

parents were working on getting him medical assistance.





17 


- Both the REGIS and the Western blot tests have come back positive for Lyme 

disease.  The patient is taking doxycycline 100 mg twice a day.  I let him know 

that it is possible that his depression, and to a lesser extent, his anxiety 

may be attributable to Lyme disease.  However, he has had his physical symptoms 

for at least 6 years, and his symptoms of anxiety have emerged largely in the 

past 11 weeks, circumstance which suggests that there is more going on 

clinically than simply complications of Lyme disease.





17


- Obtain an infectious disease consultation in order to receive recommendations 

for treatment, as necessary, following his planned three-week course of 

doxycycline.  Also like to rule out, if possible, central nervous system 

involvement.





11/10/17


- Dr. Payan spoke with Dr. Martinez of ID in order to obtain recommendations 

regarding the patient's Lyme Disease.  Dr. Martinez recommended continuing 

doxycycline 100 mg twice a day for an additional 1 month, with outpatient follow

-up as indicated by an absence of resolution of the patient's symptoms.








- Refer for outpatient follow-up in preparation for discharge.








- Referred to Dr. Spears with an appointment on . 








- Patient instructed to continue his doxycycline, and to follow-up with his PCP 

next week.








Risk Factors Assessment


Male:  Yes


:  Yes


/single/:  Yes


Higher / Fall in social status:  Yes


Access to guns:  No


Health problems:  Yes


Mental Health Diagnoses:  Yes


Substance use disorders:  No


Previous attempt:  No


Previous psychiatric stay:  No


Hopelessness:  No


Smoker:  No





Protective Factors Assessment


Sabianist beliefs:  No


:  No


Responsible for young children:  No


Employed:  No


Stable relationships:  No


Supportive family:  Yes


Good rapport with provider:  No


Absence of risk factors above:  Yes (risk factors were mitigated by admission 

to the inpatient unit, educating the patient about his diagnoses and symptoms, 

starting medication to target anxiety and depression, involving him in groups 

and therapy on the unit, working on healthy coping skills and a discharge 

safety plan, working on behavioral techniques for managing anxiety, involving 

his parents and a family meeting, and referring him for outpatient mental 

healthcare.  He has improved on medication, is participating in groups and 

therapy, anxiety and depression have improved, he is consistently denying 

suicidal thoughts and has not engaged in self-injurious behavior here, and as 

he is no longer at acute risk of harm to himself, can be discharged and managed 

as an outpatient at this time.  He does not of significant risk factors for 

harm to others.)





Day of Discharge Assessment


Hospital course:


On admission, the patient was extremely anxious did not feel that his symptoms 

were due to a mental health condition, and was convinced that there was 

something medically wrong with him that head not yet been identified.  Much 

time was spent educating him about the diagnosis of panic disorder and 

depression, and encouraging him to consider trying medication.  He initially 

tried hydroxyzine for sleep and anxiety, which was ineffective, so was started 

on lorazepam for anxiety, which was beneficial, and was increased to 1 mg 3 

times a day.  He eventually agreed to a trial of an SSRI, and was started on 

sertraline, which was increased 250 mg daily throughout the course of his stay.

  He was also started on trazodone for sleep, which she felt was beneficial.  A 

family meeting was held with the patient and his parents on 2017.  His 

mother brought in information she had printed off about panic disorder, saying 

that she believed that with the patient was dealing with.  The patient was 

initially dismissive of this, and significant discussion was held around his 

symptoms and that this was likely the most accurate diagnosis.  His parents 

shared that he had been a healthy child, with no developmental issues, and 

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 

engineering degree.  In  he suffered a head injury, and had no treatment at 

the time, but later required surgery for an injury that he was told was outside 

of the brain and that there would be no long-term effects.  After graduating 

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  In , he decided to change his lifestyle, sold 

his car, gave up technology, and decided to live a smaller life.  His father 

said that he had "confessed his sins and a desire to change," and the patient 

was upset that his father provided this information, and did not want to 

discuss it further.  In , his paternal grandfather  and he struggled 

with this as they were close.  His older brother lives at home and has 

schizophrenia, and the patient does not associate with him much.  The patient 

and his parents shared that they were very worried that the patient would also 

end up with schizophrenia.  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 

coming in August.  While the patient had been insisting that he did not know 

the reason for this, his mother disclosed that he had feelings for the woman 

running the farm and she felt uncomfortable.  Although he had been instructed 

not to continue to contact her, he continued to write and call her, demanding 

answers about why he was laid off.  He was ultimately charged with criminal 

harassment, and has a hearing coming up in December.  The patient insisted that 

this was not the reason for his anxiety, but his parents felt it was a 

significant contributing factor.  Parents shared that he doesn't drive and is 

socially isolated, with few contacts outside of his family.  He was able to 

engage socially with peers and staff on the unit, and affect brightens 

significantly when he was interacting with others.  He appeared to enjoy 

playing games with peers, and his affect was often incongruent with his stated 

mood, as he appeared euthymic and cheerful, but continued to report low mood.  

His suicidal thoughts improved, as did anxiety and mood, and he was able to 

work on his safety plan and a plan to increase his socialization at home.  He 

was very resistant to discussing discharge, feeling that he needed to be in the 

hospital for an extended period of time, but after a 10 day stay, felt that he 

was ready to try going home.  He was referred to [facility] as he 

requested outpatient services similar to the group programming he had been 

receiving in the hospital.  He also discussed options for finding a new 

volunteer position.  The results of his Lyme tests were reviewed with him, and 

both the REGIS and Western blot came back positive.  Infectious disease was 

contacted and recommended that he continued doxycycline 100 mg twice a day for 

an additional month, and to follow-up with his PCP.  He was referred to Dr. Moore for primary care services.





Day of discharge assessment:


The patient states that his mood and anxiety have improved significantly from 

admission, but he continues to struggle in the mornings, and feels that he has 

to "stay in bed until my meds kicked in."  He is able to list multiple things 

that he can do to help improve his symptoms in the morning, including getting 

out of bed, going outside, and walking his dogs.  He denies any suicidal 

thoughts, and is able to review his safety plan in detail, including spending 

time [place], walking his dogs, meditating, talking to his 

parents, and ultimately calling his outpatient providers, the crisis line, or 

going to the emergency room.  He agrees to return to the hospital if he feels 

unsafe, and feels that his treatment here has been helpful.  He feels his 

medications are helpful, and denies side effects.  We reviewed the plan to 

taper off his lorazepam over the next 4 weeks, and a written taper schedule was 

provided in his discharge paperwork.  We also reviewed his outpatient follow-up 

plans, both medical and psychiatric.  He has not had panic attacks since the 

beginning of his hospital stay, and overall anxiety is much decreased.





Well nourished, well developed WM appearing stated age. Casually dressed and 

adequately groomed.  Calm and cooperative.  Seated in NAD, with fair eye 

contact and no abnormal movements.  Speech is normal rate, volume, and tone.  

Mood is "okay," and affect is stable and congruent.  Thoughts are linear, 

logical and goal directed.  The patient denied suicidal and homicidal ideation 

and was able to safety plan.  No paranoia, delusions, or hallucinations, and 

did not appear to be responding to internal stimuli.  Cognition was grossly 

intact.  Alert and oriented to person, place and time.  Intelligence is 

consistent with level of education.  Insight and and judgment are fair.





Laboratory











Test


  17


11:00 17


11:24


 


Urine Color YELLOW  


 


Urine Appearance CLOUDY  


 


Urine pH >= 9.0  


 


Urine Specific Gravity 1.027  


 


Urine Protein NEG  


 


Urine Glucose (UA) NEG  


 


Urine Ketones 1+  


 


Urine Occult Blood NEG  


 


Urine Nitrite NEG  


 


Urine Bilirubin NEG  


 


Urine Urobilinogen NEG  


 


Urine Leukocyte Esterase TRACE  


 


Urine WBC (Auto) 1-5  


 


Urine RBC (Auto) 0-4  


 


Urine Hyaline Casts (Auto) 0  


 


Urine Epithelial Cells (Auto) 0-5  


 


Urine Bacteria (Auto) NEG  


 


Urine Opiates Screen NEG  


 


Urine Methadone, Qualitative NEG  


 


Urine Barbiturates NEG  


 


Urine Phencyclidine (PCP)


Level NEG 


  


 


 


Ur


Amphetamine/Methamphetamine NEG 


  


 


 


MDMA (Ecstasy) Screen NEG  


 


Urine Benzodiazepines Screen NEG  


 


Urine Cocaine Metabolite NEG  


 


Urine Marijuana (THC) NEG  


 


White Blood Count  5.48 


 


Red Blood Count  5.60 


 


Hemoglobin  16.7 


 


Hematocrit  47.9 


 


Mean Corpuscular Volume  85.5 


 


Mean Corpuscular Hemoglobin  29.8 


 


Mean Corpuscular Hemoglobin


Concent 


  34.9 


 


 


RDW Standard Deviation  39.0 


 


RDW Coefficient of Variation  12.4 


 


Platelet Count  259 


 


Mean Platelet Volume  10.2 


 


Sodium Level  140 


 


Potassium Level  4.0 


 


Chloride Level  107 


 


Carbon Dioxide Level  27 


 


Anion Gap  6.0 


 


Blood Urea Nitrogen  16 


 


Creatinine  1.11 


 


Est Creatinine Clear Calc


Drug Dose 


  93.8 


 


 


Estimated GFR (


American) 


  101.3 


 


 


Estimated GFR (Non-


American 


  87.4 


 


 


BUN/Creatinine Ratio  14.6 


 


Random Glucose  157 


 


Calcium Level  9.6 


 


Total Bilirubin  0.6 


 


Aspartate Amino Transferase


(AST) 


  9 


 


 


Alanine Aminotransferase (ALT)  17 


 


Alkaline Phosphatase  45 


 


Total Protein  7.8 


 


Albumin  4.7 


 


Globulin  3.1 


 


Albumin/Globulin Ratio  1.5 


 


Thyroid Stimulating Hormone


(TSH) 


  0.727 


 


 


Salicylates Level  < 1.7 


 


Acetaminophen Level  < 2 


 


Ethyl Alcohol mg/dL  < 3.0 











Total Time


Total Time Spent (min):  Greater than 30 minutes


Total Time Included:  examination of the patient, discharge planning, 

medication reconciliation





Tobacco Cessation at Discharge


Smoking Status:  Never Smoker


FDA approved Prescription:  non-smoker





Problem Qualifiers





(1) Depression:  


Depression Type:  major depressive disorder  Major depression recurrence:  

single episode  Active/Remission status:  currently active  Major depression 

episode severity:  severe  Psychotic features:  without psychotic features  

Qualified Codes:  F32.2 - Major depressive disorder, single episode, severe 

without psychotic features

## 2017-12-13 NOTE — EMERGENCY ROOM VISIT NOTE
History


Report prepared by Isreal:  Nathan Sparks


Under the Supervision of:  Dr. Kelvin Simpson M.D.


First contact with patient:  19:50


Chief Complaint:  ANXIETY


Stated Complaint:  LYMES DISEASE, ANXIETY ATTACK





History of Present Illness


The patient is a 32 year old male who presents to the Emergency Room with 

complaints of intermittent panic attacks for the last three days PTA. He was 

recently discharged from the [facility] 2 hours PTA. He was discharged and advised 

to come to the ED. He was recently diagnosed with Lyme disease on October 31, 2017. He notes his panic attacks started s/p diagnosis, though they have 

worsened. He notes losing 30 pounds prior to his diagnosis. He was on 

Doxycycline for five weeks. He notes abdominal pain, loss of appetite, chest 

pain, and chronic right shoulder pain. He denies any fevers, rash, and 

headache. He was being treated at [place] for anxiety and depression. He 

denies any current SI. He notes that he mentally feels better. He notes that he 

often gets tick bites, [place].





   Source of History:  patient


   Onset:  three days PTA


   Position:  other (global)


   Quality:  other (paniac attacks)


   Timing:  intermittent


   Associated Symptoms:  + chest pain, + abdominal pain, No fevers, No headache

, No rash


Note:


He notes paniac attacks, loss of appetite, and chronic right shoulder pain. 


He notes recent weight loss of 30 pounds. 


He denies any current SI.





Review of Systems


As above. All other systems reviewed were negative  unless otherwise stated in 

history. At least 10 were reviewed





Past Medical & Surgical


Medical Problems:


(1) Anxiety


(2) Chronic pain


(3) Depression


(4) Lyme disease


(5) Panic disorder


(6) Suicidal ideation








Family History





Patient reports no known family medical history.





Social History


Smoking Status:  Never Smoker


Drug Use:  none


Marital Status:  single


Housing Status:  lives with family


Occupation Status:  unemployed





Current/Historical Medications


Scheduled


Doxycycline Hyclate (Vibramycin), 100 MG PO BID


Lorazepam (Ativan), 0.5 MG PO BID


Mirtazapine Soltab (Remeron Soltab), 30 MG PO DAILY


Propranolol Hcl (Inderal), 10 MG PO TID


Trazodone Hcl (Trazodone), 100 MG PO HS





Allergies


Coded Allergies:  


     No Known Allergies (Unverified , 12/13/17)





Physical Exam


Vital Signs











  Date Time  Temp Pulse Resp B/P (MAP) Pulse Ox O2 Delivery O2 Flow Rate FiO2


 


12/13/17 22:43  89 20 130/81 96   


 


12/13/17 22:31  85 18 121/79 97 Room Air  


 


12/13/17 21:13  94 20 128/81 97 Room Air  


 


12/13/17 19:45 36.8 112 18 173/108 95 Room Air  











Physical Exam


General: Non-ill appearing young male in no acute distress. 


HEENT: Normal cephalic atraumatic.  Pupils are equal round and reactive to 

light.  Extraocular movements are intact.  Oropharynx is pink with moist mucous 

membranes.  No swelling of the mouth lips or tongue.


Neck: Supple with a midline trachea.  No meningeal signs or stiffness, no JVD 

or bruits. No Stridor.


Chest: Clear to auscultation bilaterally.  No wheezes or rhonchi.  No increased 

work of breathing.


Heart: regular rate and rhythm. 


Abdomen: Soft nontender, nondistended without rebound guarding or rigidity.  


Extremities: No cyanosis clubbing or edema. No calf tenderness or assymetry


Spine/Back. Non tender to palpation. No CVA tenderness


Skin: Good turgor without rashes.


Neurologic exam: Cranial nerves two through 12 are intact.  Motor and sensation 

are intact and symmetrical throughout. No tremor.


Psych: Denies SI.





Medical Decision & Procedures


Laboratory Results


12/13/17 20:30








Red Blood Count 5.11, Mean Corpuscular Volume 87.5, Mean Corpuscular Hemoglobin 

30.3, Mean Corpuscular Hemoglobin Concent 34.7, Mean Platelet Volume 9.7, 

Neutrophils (%) (Auto) 73.3, Lymphocytes (%) (Auto) 18.6, Monocytes (%) (Auto) 

6.9, Eosinophils (%) (Auto) 0.6, Basophils (%) (Auto) 0.3, Neutrophils # (Auto) 

5.74, Lymphocytes # (Auto) 1.46, Monocytes # (Auto) 0.54, Eosinophils # (Auto) 

0.05, Basophils # (Auto) 0.02





12/13/17 20:30

















Test


  12/13/17


20:30


 


White Blood Count


  7.83 K/uL


(4.8-10.8)


 


Red Blood Count


  5.11 M/uL


(4.7-6.1)


 


Hemoglobin


  15.5 g/dL


(14.0-18.0)


 


Hematocrit 44.7 % (42-52) 


 


Mean Corpuscular Volume


  87.5 fL


()


 


Mean Corpuscular Hemoglobin


  30.3 pg


(25-34)


 


Mean Corpuscular Hemoglobin


Concent 34.7 g/dl


(32-36)


 


Platelet Count


  243 K/uL


(130-400)


 


Mean Platelet Volume


  9.7 fL


(7.4-10.4)


 


Neutrophils (%) (Auto) 73.3 % 


 


Lymphocytes (%) (Auto) 18.6 % 


 


Monocytes (%) (Auto) 6.9 % 


 


Eosinophils (%) (Auto) 0.6 % 


 


Basophils (%) (Auto) 0.3 % 


 


Neutrophils # (Auto)


  5.74 K/uL


(1.4-6.5)


 


Lymphocytes # (Auto)


  1.46 K/uL


(1.2-3.4)


 


Monocytes # (Auto)


  0.54 K/uL


(0.11-0.59)


 


Eosinophils # (Auto)


  0.05 K/uL


(0-0.5)


 


Basophils # (Auto)


  0.02 K/uL


(0-0.2)


 


RDW Standard Deviation


  41.3 fL


(36.4-46.3)


 


RDW Coefficient of Variation


  13.0 %


(11.5-14.5)


 


Immature Granulocyte % (Auto) 0.3 % 


 


Immature Granulocyte # (Auto)


  0.02 K/uL


(0.00-0.02)


 


Anion Gap


  6.0 mmol/L


(3-11)


 


Est Creatinine Clear Calc


Drug Dose 128.3 ml/min 


 


 


Estimated GFR (


American) 136.3 


 


 


Estimated GFR (Non-


American 117.6 


 


 


BUN/Creatinine Ratio 24.2 (10-20) 


 


Calcium Level


  8.9 mg/dl


(8.5-10.1)


 


Total Bilirubin


  0.3 mg/dl


(0.2-1)


 


Direct Bilirubin


  < 0.1 mg/dl


(0-0.2)


 


Aspartate Amino Transf


(AST/SGOT) 12 U/L (15-37) 


 


 


Alanine Aminotransferase


(ALT/SGPT) 19 U/L (12-78) 


 


 


Alkaline Phosphatase


  54 U/L


()


 


Total Protein


  6.9 gm/dl


(6.4-8.2)


 


Albumin


  4.1 gm/dl


(3.4-5.0)


 


Lipase


  174 U/L


()


 


Thyroid Stimulating Hormone


(TSH) 1.270 uIu/ml


(0.300-4.500)


 


Chemistry Specimen Hemolysis  





Laboratory studies as stated above per my review.





Medications Administered











 Medications


  (Trade)  Dose


 Ordered  Sig/Alexia


 Route  Start Time


 Stop Time Status Last Admin


Dose Admin


 


 Ceftriaxone


 Sodium 2000 mg/


 Dextrose  70 ml @ 


 100 mls/hr  ONE  STAT


 IV  12/13/17 21:31


 12/13/17 22:12 DC 12/13/17 21:49


100 MLS/HR











ECG


Indication:  other (paniac attacks)


Rate (beats per minute):  95


Rhythm:  normal sinus


Findings:  no acute ischemic change, other (Normal intervals)


Change:


No acute changes when compared to 11/2/2017.





ED Course


1754: Past medical records reviewed. The patient was evaluated in room B12B, 

and a complete history and physical examination were performed.





2130: I reassessed the patient at this time. He is feeling better and resting 

comfortably.





2131: Ordered Ceftrixone Sodium 2,000 mg/Dextrose 70 ml @ 100 mls/hr IV





2226: I reassessed the patient at this time. He is feeling better and resting 

comfortably. I discussed the results and treatment plan with the patient. I 

answered all pertaining questions that he had. He expressed understanding and 

verbalized agreement. The patient will be discharged home.





Medical Decision


Differentials include, but are not limited to; anxiety, Lyme disease, cardiac 

disease, and electrolyte or metabolic abnormality.





This patient comes in as described above.  He was placed in room B 12. He was 

diagnosed Lyme disease at the in October and did receive a course of 

antibiotics and he felt like he felt significantly better and had less anxiety 

after being started on the doxycycline.  He feels that when he was in the 

[facility] and not been received doxycycline, He's gotten worse.  He has no 

headache, neck pain, or stiffness or fever.  No joint aches.  He says his 

depression is doing much better and he denies suicidal or homicidal ideations.  

He wants to be started back on doxycycline and has appointment to see Dr. Urrutia 

next week who has treated his family for Lyme.  EKG was obtained blood work was 

obtained.  I discussed the case with both the patient's mother who is at the 

bedside.  Did work was unremarkable.  They're very concerned that this is Lyme 

related and want to be treated aggressively for this.  I did give him 2 g of IV 

Rocephin we'll start him back on doxycycline and follow-up with his doctor this 

coming week for recheck I recommend he follow up with infectious disease 

specialist.  Return to the ER if worsening of symptoms, fever or chills, 

headache, any new problems or concerns





Medication Reconcilliation


Current Medication List:  was personally reviewed by me





Blood Pressure Screening


Patient's blood pressure:  Elevated blood pressure


related to anxiety





Impression





 Primary Impression:  


 Acute anxiety


 Additional Impression:  


 Lyme disease





Scribe Attestation


The scribe's documentation has been prepared under my direction and personally 

reviewed by me in its entirety. I confirm that the note above accurately 

reflects all work, treatment, procedures, and medical decision making performed 

by me.





Departure Information


Dispostion


Home / Self-Care





Prescriptions





Doxycycline Hyclate (VIBRAMYCIN) 100 Mg Cap


100 MG PO BID for 21 Days, #42 CAP


   Prov: Kelvin Simpson M.D.         12/13/17





Referrals


No Doctor, Assigned (PCP)





Forms


HOME CARE DOCUMENTATION FORM,                                                 

               IMPORTANT VISIT INFORMATION





Patient Instructions


My [facility]





Additional Instructions





Rest


Drink plenty of fluids





Use Doxycycline 100 mg twice a day 





For anxiety, use your Ativan as directed





Return if: worsening symptoms, fever, any new problems or concerns





Problem Qualifiers

## 2017-12-14 NOTE — PHARMACY PROGRESS NOTE
ED Pharmacist Progress Note


Date of Service:


Dec 14, 2017.


[facility] called from [place] [place] [facility] stating the Rx for Doxycycline Hyclate is 

not covered by the patient's insurance, however the Monohydrate salt is 

covered.  I discussed the case with Dr Bajwa, he Ok'd the substitution of 

Monohydrate salt in same dose as it is therapeutically equivalent.

## 2017-12-20 NOTE — EMERGENCY ROOM VISIT NOTE
History


Report prepared by Isreal:  Patty Lim


Under the Supervision of:  Dr. Gregory Cotto M.D.


First contact with patient:  19:37


Chief Complaint:  MENTAL HEALTH EVALUATION


Stated Complaint:  COMPLICATIONS DUE TO LYMES DISEASE





History of Present Illness


The patient is a 32 year old male who presents to the Emergency Room with 

complaints of persistent anxiety starting 2 months ago. The patient has been 

having anxiety issues which started after he was diagnosed with lyme disease 2 

months ago. He has been on doxycycline which improves his symptoms. He was in 

the [facility] around 2 weeks ago. He was taken off the doxycycline during his 

stay. His anxiety worsened so he was discharged from the [facility]. He started 

doxycycline again 1 week ago. The patient is now having thoughts of hurting 

himself. He has been having panic attacks over the past week. He hopes to go 

back to the [facility]. His parents are concerned that the lyme disease is 

affecting his brain. He has lost weight and has not been eating. He denies any 

headache or neck pain.





   Source of History:  patient, parent


   Onset:  2 months ago


   Position:  other (global)


   Quality:  other (anxiety)


   Timing:  other (persistent)


   Associated Symptoms:  No headache, No neck pain


Note:


Pt reports weight loss, decreased appetite.





Review of Systems


See HPI for pertinent positives & negatives. A total of 10 systems reviewed and 

were otherwise negative.





Past Medical & Surgical


Medical Problems:


(1) Anxiety


(2) Chronic pain


(3) Depression


(4) Lyme disease


(5) Panic disorder


(6) Suicidal ideation








Family History





Patient reports no known family medical history.





Social History


Smoking Status:  Never Smoker


Drug Use:  none


Marital Status:  single


Housing Status:  lives with family


Occupation Status:  unemployed





Current/Historical Medications


Scheduled


Doxycycline Hyclate (Vibramycin), 100 MG PO BID


Lactobacillus Acidophilus (Lactinex), 1 TAB PO DAILY


Lorazepam (Ativan), 0.5 MG PO BID


Propranolol Hcl (Inderal), 10 MG PO TID


Trazodone Hcl (Trazodone),  MG PO HS





Scheduled PRN


Hydroxyzine Pamoate (Vistaril), 25 MG PO DAILY PRN for Anxiety


Ibuprofen Tab (Motrin), 800 MG PO Q8H PRN for Pain


Melatonin (Kp Melatonin), 3-10 MG PO HS PRN for Sleep





Allergies


Coded Allergies:  


     No Known Allergies (Unverified , 12/20/17)





Physical Exam


Vital Signs











  Date Time  Temp Pulse Resp B/P (MAP) Pulse Ox O2 Delivery O2 Flow Rate FiO2


 


12/20/17 21:25  91 18 138/90 97 Room Air  


 


12/20/17 19:32 36.5 103 18 138/97 98 Room Air  











Physical Exam


GENERAL: Patient is a healthy-appearing well-nourished male


HEAD: Normocephalic atraumatic


EYES: Ocular movements intact pupils equal and react to light


OROPHARYNX mucous membranes are moist no exudates present no erythema or edema 

present


NECK: Supple no nuchal rigidity


CHEST: Good equal expansion


LUNGS: Clear and equal to auscultation


CARDIAC: Normal S1 and S2


ABDOMEN: Soft nontender no guarding


BACK: No CVA tenderness


EXTREMITIES: No pain upon palpation normal muscle strength in all groups no 

clubbing cyanosis or edema


NEURO: Patient is following commands and answering questions appropriately. 

Alert and oriented x3 Cranial Nerves 2-12 grossly intact





Medical Decision & Procedures


Laboratory Results


12/20/17 20:04








Red Blood Count 5.39, Mean Corpuscular Volume 88.3, Mean Corpuscular Hemoglobin 

30.1, Mean Corpuscular Hemoglobin Concent 34.0, Mean Platelet Volume 9.6, 

Neutrophils (%) (Auto) 68.8, Lymphocytes (%) (Auto) 21.0, Monocytes (%) (Auto) 

9.1, Eosinophils (%) (Auto) 0.7, Basophils (%) (Auto) 0.2, Neutrophils # (Auto) 

5.79, Lymphocytes # (Auto) 1.77, Monocytes # (Auto) 0.77, Eosinophils # (Auto) 

0.06, Basophils # (Auto) 0.02





12/20/17 20:04

















Test


  12/20/17


19:52 12/20/17


20:04


 


Urine Color YELLOW  


 


Urine Appearance CLEAR (CLEAR)  


 


Urine pH 5.5 (4.5-7.5)  


 


Urine Specific Gravity


  1.030


(1.000-1.030) 


 


 


Urine Protein NEG (NEG)  


 


Urine Glucose (UA) NEG (NEG)  


 


Urine Ketones NEG (NEG)  


 


Urine Occult Blood NEG (NEG)  


 


Urine Nitrite NEG (NEG)  


 


Urine Bilirubin NEG (NEG)  


 


Urine Urobilinogen NEG (NEG)  


 


Urine Leukocyte Esterase NEG (NEG)  


 


Urine Opiates Screen NEG (NEG)  


 


Urine Methadone, Qualitative NEG (NEG)  


 


Urine Barbiturates NEG (NEG)  


 


Urine Phencyclidine (PCP)


Level NEG (NEG) 


  


 


 


Ur


Amphetamine/Methamphetamine NEG (NEG) 


  


 


 


MDMA (Ecstasy) Screen NEG (NEG)  


 


Urine Benzodiazepines Screen NEG (NEG)  


 


Urine Cocaine Metabolite NEG (NEG)  


 


Urine Marijuana (THC) NEG (NEG)  


 


White Blood Count


  


  8.43 K/uL


(4.8-10.8)


 


Red Blood Count


  


  5.39 M/uL


(4.7-6.1)


 


Hemoglobin


  


  16.2 g/dL


(14.0-18.0)


 


Hematocrit  47.6 % (42-52) 


 


Mean Corpuscular Volume


  


  88.3 fL


()


 


Mean Corpuscular Hemoglobin


  


  30.1 pg


(25-34)


 


Mean Corpuscular Hemoglobin


Concent 


  34.0 g/dl


(32-36)


 


Platelet Count


  


  247 K/uL


(130-400)


 


Mean Platelet Volume


  


  9.6 fL


(7.4-10.4)


 


Neutrophils (%) (Auto)  68.8 % 


 


Lymphocytes (%) (Auto)  21.0 % 


 


Monocytes (%) (Auto)  9.1 % 


 


Eosinophils (%) (Auto)  0.7 % 


 


Basophils (%) (Auto)  0.2 % 


 


Neutrophils # (Auto)


  


  5.79 K/uL


(1.4-6.5)


 


Lymphocytes # (Auto)


  


  1.77 K/uL


(1.2-3.4)


 


Monocytes # (Auto)


  


  0.77 K/uL


(0.11-0.59)


 


Eosinophils # (Auto)


  


  0.06 K/uL


(0-0.5)


 


Basophils # (Auto)


  


  0.02 K/uL


(0-0.2)


 


RDW Standard Deviation


  


  41.8 fL


(36.4-46.3)


 


RDW Coefficient of Variation


  


  13.1 %


(11.5-14.5)


 


Immature Granulocyte % (Auto)  0.2 % 


 


Immature Granulocyte # (Auto)


  


  0.02 K/uL


(0.00-0.02)


 


Anion Gap


  


  4.0 mmol/L


(3-11)


 


Est Creatinine Clear Calc


Drug Dose 


  128.6 ml/min 


 


 


Estimated GFR (


American) 


  137.0 


 


 


Estimated GFR (Non-


American 


  118.2 


 


 


BUN/Creatinine Ratio  20.3 (10-20) 


 


Calcium Level


  


  9.2 mg/dl


(8.5-10.1)


 


Total Bilirubin


  


  0.4 mg/dl


(0.2-1)


 


Direct Bilirubin


  


  < 0.1 mg/dl


(0-0.2)


 


Aspartate Amino Transf


(AST/SGOT) 


  17 U/L (15-37) 


 


 


Alanine Aminotransferase


(ALT/SGPT) 


  20 U/L (12-78) 


 


 


Alkaline Phosphatase


  


  53 U/L


()


 


Total Protein


  


  7.2 gm/dl


(6.4-8.2)


 


Albumin


  


  4.2 gm/dl


(3.4-5.0)


 


Thyroid Stimulating Hormone


(TSH) 


  2.000 uIu/ml


(0.300-4.500)


 


Ethyl Alcohol mg/dL


  


  < 3.0 mg/dl


(0-3)


 


Lyme Disease IgG Antibody  POS (NEG) 





Labs reviewed by ED physician.





Medications Administered











 Medications


  (Trade)  Dose


 Ordered  Sig/Alexia


 Route  Start Time


 Stop Time Status Last Admin


Dose Admin


 


 Ceftriaxone Sodium


  (Rocephin Inj)  2 gm  NOW  STAT


 IV  12/20/17 20:33


 12/20/17 20:34 DC 12/20/17 21:16


2 GM


 


 Ondansetron HCl


  (Zofran Inj)  4 mg  NOW  STAT


 IV  12/20/17 21:09


 12/20/17 21:10 DC 12/20/17 21:16


4 MG











ED Course


2017: Past medical records reviewed. The patient was evaluated in room A5. A 

complete history and physical examination was performed. 





2033: Rocephin Inj 2 gm IV.





2109: Zofran Inj 4 mg IV.





Medical Decision


Differential diagnosis:


Etiologies such as mood disorder, infection, hypoglycemia, electrolyte 

abnormalities, cardiac sources, intracerebral event, toxicologic, neurologic, 

as well as others were entertained.





This is a 32-year-old male who presents emergency department over concerns that 

his Lyme disease is causing him anxiety.  I will note that the patient denies 

having a headache has no evidence of meningitis encephalitis on examination.  I 

did recommend to the patient if he felt that the Lyme disease was causing his 

anxiety he probably should have a lumbar puncture however the patient refused 

this.  In listening to the patient's story I suspect he is had Lyme first quite 

some time and is not until the diagnosis of Lyme was made that the patient 

began having anxiety.  Based on this finding I do feel the patient is medically 

clear.  He has been continuing on doxycycline.  In addition the patient was 

given 2 g of Rocephin.  He was discussed with case management and the patient 

will go to [facility].





Medication Reconcilliation


Current Medication List:  was personally reviewed by me





Blood Pressure Screening


Patient's blood pressure:  Elevated blood pressure


Blood pressure disposition:  Referred to PCP





Impression





 Primary Impression:  


 Mood disorder





Scribe Attestation


The scribe's documentation has been prepared under my direction and personally 

reviewed by me in its entirety. I confirm that the note above accurately 

reflects all work, treatment, procedures, and medical decision making performed 

by me.





Departure Information


Dispostion


Mental Health Acute Care





Referrals


ROCAEL [facility] MD (PCP)





Patient Instructions


My [facility]